# Patient Record
Sex: MALE | Race: WHITE | NOT HISPANIC OR LATINO | Employment: OTHER
[De-identification: names, ages, dates, MRNs, and addresses within clinical notes are randomized per-mention and may not be internally consistent; named-entity substitution may affect disease eponyms.]

---

## 2017-06-02 ENCOUNTER — RECORDS - HEALTHEAST (OUTPATIENT)
Dept: ADMINISTRATIVE | Facility: OTHER | Age: 72
End: 2017-06-02

## 2018-06-08 ENCOUNTER — RECORDS - HEALTHEAST (OUTPATIENT)
Dept: ADMINISTRATIVE | Facility: OTHER | Age: 73
End: 2018-06-08

## 2019-04-15 ENCOUNTER — AMBULATORY - HEALTHEAST (OUTPATIENT)
Dept: MULTI SPECIALTY CLINIC | Facility: CLINIC | Age: 74
End: 2019-04-15

## 2019-08-19 ENCOUNTER — RECORDS - HEALTHEAST (OUTPATIENT)
Dept: ADMINISTRATIVE | Facility: OTHER | Age: 74
End: 2019-08-19

## 2020-03-11 ENCOUNTER — COMMUNICATION - HEALTHEAST (OUTPATIENT)
Dept: SCHEDULING | Facility: CLINIC | Age: 75
End: 2020-03-11

## 2020-03-12 ENCOUNTER — OFFICE VISIT - HEALTHEAST (OUTPATIENT)
Dept: FAMILY MEDICINE | Facility: CLINIC | Age: 75
End: 2020-03-12

## 2020-03-12 ENCOUNTER — COMMUNICATION - HEALTHEAST (OUTPATIENT)
Dept: TELEHEALTH | Facility: CLINIC | Age: 75
End: 2020-03-12

## 2020-03-12 DIAGNOSIS — S02.109S: ICD-10-CM

## 2020-03-12 DIAGNOSIS — G44.309 POST-CONCUSSION HEADACHE: ICD-10-CM

## 2020-03-12 DIAGNOSIS — H91.93 BILATERAL HEARING LOSS, UNSPECIFIED HEARING LOSS TYPE: ICD-10-CM

## 2020-03-12 ASSESSMENT — MIFFLIN-ST. JEOR: SCORE: 1496.49

## 2020-03-16 ENCOUNTER — COMMUNICATION - HEALTHEAST (OUTPATIENT)
Dept: FAMILY MEDICINE | Facility: CLINIC | Age: 75
End: 2020-03-16

## 2020-03-17 ENCOUNTER — TRANSCRIBE ORDERS (OUTPATIENT)
Dept: OTHER | Age: 75
End: 2020-03-17

## 2020-03-17 DIAGNOSIS — H91.90 HEARING LOSS: Primary | ICD-10-CM

## 2020-03-17 DIAGNOSIS — S02.109A: ICD-10-CM

## 2020-03-18 ENCOUNTER — COMMUNICATION - HEALTHEAST (OUTPATIENT)
Dept: FAMILY MEDICINE | Facility: CLINIC | Age: 75
End: 2020-03-18

## 2020-03-19 NOTE — TELEPHONE ENCOUNTER
FUTURE VISIT INFORMATION      FUTURE VISIT INFORMATION:    Date: 4/21/20    Time: 9:30 AM; Audio 8:30 AM    Location: [facility] [place]  REFERRAL INFORMATION:    Referring provider: Dr. Diana Bess    Referring providers clinic:  [facility]    Reason for visit/diagnosis: Bilateral Hearing Loss and closed fracture of base of skull    RECORDS REQUESTED FROM:       Clinic name Comments Records Status Imaging Status   [facility] 3/12/20 - PCC OV with Dr. Bess Care Everywhere    [facility] 3/3/20 to 3/5/20 - ED Stay with Dr. Santos Care Everywhere     - [facility] - Imaging 3/4/20 - CT Head WO  3/3/20 - CT Ear 2D Reconstruction  3/3/20 - CT Head WO Care Everywhere 3/19 Req - In PACs                 * 3/19/20 2:40 PM Faxed req to [facility] for images to be pushed - Becka

## 2020-04-15 ENCOUNTER — TELEPHONE (OUTPATIENT)
Dept: OTOLARYNGOLOGY | Facility: CLINIC | Age: 75
End: 2020-04-15

## 2020-04-15 NOTE — TELEPHONE ENCOUNTER
"LVM to reschedule to video visit,      If pt would like to do telephone visit for 4/21 appt please confirm best number for contact. If pt would like video visit please confirm e-mail and let pt know a clinic staff member will reach out 2 days prior to help set up appointment.\"    If patient wants to reschedule to in person visit, schedule 8-12 weeks out.  "

## 2020-04-21 ENCOUNTER — PRE VISIT (OUTPATIENT)
Dept: OTOLARYNGOLOGY | Facility: CLINIC | Age: 75
End: 2020-04-21

## 2020-07-09 ENCOUNTER — TELEPHONE (OUTPATIENT)
Dept: OTOLARYNGOLOGY | Facility: CLINIC | Age: 75
End: 2020-07-09

## 2020-07-09 NOTE — TELEPHONE ENCOUNTER
LVM to reschedule to video visit,- Keep audio      If pt would like to do telephone visit for 7/ appt please confirm best number for contact. If pt would like video visit please confirm e-mail and sign them up for mychart as we will sent set up instructions though that.

## 2020-07-13 NOTE — TELEPHONE ENCOUNTER
LVMt hat appt has been converted to a telephone visit with a WIN prior.    If patient wishes to reschedule, reschedule to end of August when Dr. Nissen is back in clinic. Gave call center number

## 2020-09-14 ENCOUNTER — OFFICE VISIT - HEALTHEAST (OUTPATIENT)
Dept: FAMILY MEDICINE | Facility: CLINIC | Age: 75
End: 2020-09-14

## 2020-09-14 DIAGNOSIS — Z00.00 ROUTINE GENERAL MEDICAL EXAMINATION AT A HEALTH CARE FACILITY: ICD-10-CM

## 2020-09-14 DIAGNOSIS — I10 ESSENTIAL HYPERTENSION: ICD-10-CM

## 2020-09-14 DIAGNOSIS — M54.42 CHRONIC BILATERAL LOW BACK PAIN WITH LEFT-SIDED SCIATICA: ICD-10-CM

## 2020-09-14 DIAGNOSIS — N52.9 ERECTILE DYSFUNCTION, UNSPECIFIED ERECTILE DYSFUNCTION TYPE: ICD-10-CM

## 2020-09-14 DIAGNOSIS — G89.29 CHRONIC BILATERAL LOW BACK PAIN WITH LEFT-SIDED SCIATICA: ICD-10-CM

## 2020-09-14 LAB
ALBUMIN SERPL-MCNC: 4.3 G/DL (ref 3.5–5)
ALP SERPL-CCNC: 49 U/L (ref 45–120)
ALT SERPL W P-5'-P-CCNC: 25 U/L (ref 0–45)
ANION GAP SERPL CALCULATED.3IONS-SCNC: 10 MMOL/L (ref 5–18)
AST SERPL W P-5'-P-CCNC: 26 U/L (ref 0–40)
BILIRUB SERPL-MCNC: 0.3 MG/DL (ref 0–1)
BUN SERPL-MCNC: 36 MG/DL (ref 8–28)
CALCIUM SERPL-MCNC: 9.4 MG/DL (ref 8.5–10.5)
CHLORIDE BLD-SCNC: 108 MMOL/L (ref 98–107)
CHOLEST SERPL-MCNC: 178 MG/DL
CO2 SERPL-SCNC: 21 MMOL/L (ref 22–31)
CREAT SERPL-MCNC: 1.82 MG/DL (ref 0.7–1.3)
FASTING STATUS PATIENT QL REPORTED: YES
GFR SERPL CREATININE-BSD FRML MDRD: 37 ML/MIN/1.73M2
GLUCOSE BLD-MCNC: 88 MG/DL (ref 70–125)
HDLC SERPL-MCNC: 66 MG/DL
LDLC SERPL CALC-MCNC: 100 MG/DL
POTASSIUM BLD-SCNC: 4.8 MMOL/L (ref 3.5–5)
PROT SERPL-MCNC: 7.1 G/DL (ref 6–8)
SODIUM SERPL-SCNC: 139 MMOL/L (ref 136–145)
TRIGL SERPL-MCNC: 59 MG/DL

## 2020-09-14 ASSESSMENT — MIFFLIN-ST. JEOR: SCORE: 1493.09

## 2020-10-21 ENCOUNTER — OFFICE VISIT - HEALTHEAST (OUTPATIENT)
Dept: FAMILY MEDICINE | Facility: CLINIC | Age: 75
End: 2020-10-21

## 2020-10-21 DIAGNOSIS — N28.9 RENAL INSUFFICIENCY: ICD-10-CM

## 2020-10-21 DIAGNOSIS — I10 ESSENTIAL HYPERTENSION: ICD-10-CM

## 2020-10-21 LAB
ANION GAP SERPL CALCULATED.3IONS-SCNC: 10 MMOL/L (ref 5–18)
BUN SERPL-MCNC: 33 MG/DL (ref 8–28)
CALCIUM SERPL-MCNC: 9.3 MG/DL (ref 8.5–10.5)
CHLORIDE BLD-SCNC: 110 MMOL/L (ref 98–107)
CO2 SERPL-SCNC: 21 MMOL/L (ref 22–31)
CREAT SERPL-MCNC: 1.7 MG/DL (ref 0.7–1.3)
GFR SERPL CREATININE-BSD FRML MDRD: 40 ML/MIN/1.73M2
GLUCOSE BLD-MCNC: 99 MG/DL (ref 70–125)
POTASSIUM BLD-SCNC: 4.4 MMOL/L (ref 3.5–5)
SODIUM SERPL-SCNC: 141 MMOL/L (ref 136–145)

## 2020-11-25 ENCOUNTER — HOSPITAL ENCOUNTER (OUTPATIENT)
Dept: ULTRASOUND IMAGING | Facility: CLINIC | Age: 75
Discharge: HOME | End: 2020-11-25
Attending: FAMILY MEDICINE

## 2020-11-25 DIAGNOSIS — N28.9 RENAL INSUFFICIENCY: ICD-10-CM

## 2020-12-23 ENCOUNTER — COMMUNICATION - HEALTHEAST (OUTPATIENT)
Dept: FAMILY MEDICINE | Facility: CLINIC | Age: 75
End: 2020-12-23

## 2020-12-23 ENCOUNTER — AMBULATORY - HEALTHEAST (OUTPATIENT)
Dept: FAMILY MEDICINE | Facility: CLINIC | Age: 75
End: 2020-12-23

## 2020-12-23 DIAGNOSIS — D73.89 SPLENIC LESION: ICD-10-CM

## 2021-01-06 ENCOUNTER — HOSPITAL ENCOUNTER (OUTPATIENT)
Dept: MRI IMAGING | Facility: CLINIC | Age: 76
Discharge: HOME | End: 2021-01-06
Attending: FAMILY MEDICINE

## 2021-01-06 DIAGNOSIS — D73.89 SPLENIC LESION: ICD-10-CM

## 2021-01-06 LAB
CREAT BLD-MCNC: 1.7 MG/DL (ref 0.7–1.3)
GFR SERPL CREATININE-BSD FRML MDRD: 39 ML/MIN/1.73M2

## 2021-01-07 ENCOUNTER — COMMUNICATION - HEALTHEAST (OUTPATIENT)
Dept: FAMILY MEDICINE | Facility: CLINIC | Age: 76
End: 2021-01-07

## 2021-02-02 ENCOUNTER — RECORDS - HEALTHEAST (OUTPATIENT)
Dept: ADMINISTRATIVE | Facility: OTHER | Age: 76
End: 2021-02-02

## 2021-02-04 ENCOUNTER — AMBULATORY - HEALTHEAST (OUTPATIENT)
Dept: NURSING | Facility: CLINIC | Age: 76
End: 2021-02-04

## 2021-02-04 ENCOUNTER — COMMUNICATION - HEALTHEAST (OUTPATIENT)
Dept: FAMILY MEDICINE | Facility: CLINIC | Age: 76
End: 2021-02-04

## 2021-02-04 ENCOUNTER — AMBULATORY - HEALTHEAST (OUTPATIENT)
Dept: LAB | Facility: CLINIC | Age: 76
End: 2021-02-04

## 2021-02-04 DIAGNOSIS — I10 ESSENTIAL HYPERTENSION: ICD-10-CM

## 2021-02-04 DIAGNOSIS — N18.32 CHRONIC KIDNEY DISEASE (CKD) STAGE G3B/A1, MODERATELY DECREASED GLOMERULAR FILTRATION RATE (GFR) BETWEEN 30-44 ML/MIN/1.73 SQUARE METER AND ALBUMINURIA CREATININE RATIO LESS THAN 30 MG/G (H): ICD-10-CM

## 2021-02-04 LAB
ALBUMIN SERPL-MCNC: 4.5 G/DL (ref 3.5–5)
ALBUMIN UR-MCNC: NEGATIVE MG/DL
ANION GAP SERPL CALCULATED.3IONS-SCNC: 10 MMOL/L (ref 5–18)
APPEARANCE UR: CLEAR
BACTERIA #/AREA URNS HPF: NORMAL HPF
BILIRUB UR QL STRIP: NEGATIVE
BUN SERPL-MCNC: 38 MG/DL (ref 8–28)
CALCIUM SERPL-MCNC: 9.6 MG/DL (ref 8.5–10.5)
CHLORIDE BLD-SCNC: 108 MMOL/L (ref 98–107)
CO2 SERPL-SCNC: 23 MMOL/L (ref 22–31)
COLOR UR AUTO: YELLOW
CREAT SERPL-MCNC: 1.68 MG/DL (ref 0.7–1.3)
CREAT UR-MCNC: 124.4 MG/DL
ERYTHROCYTE [DISTWIDTH] IN BLOOD BY AUTOMATED COUNT: 10.2 % (ref 11–14.5)
GFR SERPL CREATININE-BSD FRML MDRD: 40 ML/MIN/1.73M2
GLUCOSE BLD-MCNC: 81 MG/DL (ref 70–125)
GLUCOSE UR STRIP-MCNC: NEGATIVE MG/DL
HCT VFR BLD AUTO: 38.8 % (ref 40–54)
HGB BLD-MCNC: 13.2 G/DL (ref 14–18)
HGB UR QL STRIP: NEGATIVE
HYALINE CASTS #/AREA URNS LPF: NORMAL LPF
KETONES UR STRIP-MCNC: NEGATIVE MG/DL
LEUKOCYTE ESTERASE UR QL STRIP: NEGATIVE
MCH RBC QN AUTO: 36.1 PG (ref 27–34)
MCHC RBC AUTO-ENTMCNC: 34 G/DL (ref 32–36)
MCV RBC AUTO: 106 FL (ref 80–100)
NITRATE UR QL: NEGATIVE
PH UR STRIP: 5.5 [PH] (ref 5–8)
PHOSPHATE SERPL-MCNC: 3.4 MG/DL (ref 2.5–4.5)
PLATELET # BLD AUTO: 360 THOU/UL (ref 140–440)
PMV BLD AUTO: 7.8 FL (ref 7–10)
POTASSIUM BLD-SCNC: 5.5 MMOL/L (ref 3.5–5)
PROTEIN, RANDOM URINE - HISTORICAL: 13 MG/DL
PROTEIN/CREAT RATIO, RANDOM UR: 0.1
PTH-INTACT SERPL-MCNC: 73 PG/ML (ref 10–86)
RBC # BLD AUTO: 3.65 MILL/UL (ref 4.4–6.2)
RBC #/AREA URNS AUTO: NORMAL HPF
SODIUM SERPL-SCNC: 141 MMOL/L (ref 136–145)
SP GR UR STRIP: 1.02 (ref 1–1.03)
SQUAMOUS #/AREA URNS AUTO: NORMAL LPF
UROBILINOGEN UR STRIP-ACNC: NORMAL
WBC #/AREA URNS AUTO: NORMAL HPF
WBC: 5.7 THOU/UL (ref 4–11)

## 2021-02-05 LAB
25(OH)D3 SERPL-MCNC: 23.8 NG/ML (ref 30–80)
ALBUMIN PERCENT: 69 % (ref 51–67)
ALBUMIN SERPL ELPH-MCNC: 4.9 G/DL (ref 3.2–4.7)
ALPHA 1 PERCENT: 2.3 % (ref 2–4)
ALPHA 2 PERCENT: 9.3 % (ref 5–13)
ALPHA1 GLOB SERPL ELPH-MCNC: 0.2 G/DL (ref 0.1–0.3)
ALPHA2 GLOB SERPL ELPH-MCNC: 0.7 G/DL (ref 0.4–0.9)
B-GLOBULIN SERPL ELPH-MCNC: 0.6 G/DL (ref 0.7–1.2)
BETA PERCENT: 8.5 % (ref 10–17)
GAMMA GLOB SERPL ELPH-MCNC: 0.8 G/DL (ref 0.6–1.4)
GAMMA GLOBULIN PERCENT: 10.9 % (ref 9–20)
M PROTEIN SERPL ELPH-MCNC: 0.2 G/DL
PATH ICD:: ABNORMAL
PROT PATTERN SERPL ELPH-IMP: ABNORMAL
PROT SERPL-MCNC: 7.1 G/DL (ref 6–8)
REVIEWING PATHOLOGIST: ABNORMAL

## 2021-02-07 LAB
KAPPA LC FREE SER-MCNC: 1.66 MG/DL (ref 0.33–1.94)
KAPPA LC FREE/LAMBDA FREE SER NEPH: 1.11 {RATIO} (ref 0.26–1.65)
LAMBDA LC FREE SERPL-MCNC: 1.49 MG/DL (ref 0.57–2.63)

## 2021-02-11 ENCOUNTER — AMBULATORY - HEALTHEAST (OUTPATIENT)
Dept: LAB | Facility: CLINIC | Age: 76
End: 2021-02-11

## 2021-02-11 DIAGNOSIS — N18.32 CHRONIC KIDNEY DISEASE (CKD) STAGE G3B/A1, MODERATELY DECREASED GLOMERULAR FILTRATION RATE (GFR) BETWEEN 30-44 ML/MIN/1.73 SQUARE METER AND ALBUMINURIA CREATININE RATIO LESS THAN 30 MG/G (H): ICD-10-CM

## 2021-03-04 ENCOUNTER — AMBULATORY - HEALTHEAST (OUTPATIENT)
Dept: LAB | Facility: CLINIC | Age: 76
End: 2021-03-04

## 2021-03-04 DIAGNOSIS — N18.32 CHRONIC KIDNEY DISEASE (CKD) STAGE G3B/A1, MODERATELY DECREASED GLOMERULAR FILTRATION RATE (GFR) BETWEEN 30-44 ML/MIN/1.73 SQUARE METER AND ALBUMINURIA CREATININE RATIO LESS THAN 30 MG/G (H): ICD-10-CM

## 2021-03-04 LAB
ANION GAP SERPL CALCULATED.3IONS-SCNC: 13 MMOL/L (ref 5–18)
BUN SERPL-MCNC: 66 MG/DL (ref 8–28)
CALCIUM SERPL-MCNC: 9.9 MG/DL (ref 8.5–10.5)
CHLORIDE BLD-SCNC: 107 MMOL/L (ref 98–107)
CO2 SERPL-SCNC: 18 MMOL/L (ref 22–31)
CREAT SERPL-MCNC: 2.09 MG/DL (ref 0.7–1.3)
GFR SERPL CREATININE-BSD FRML MDRD: 31 ML/MIN/1.73M2
GLUCOSE BLD-MCNC: 82 MG/DL (ref 70–125)
POTASSIUM BLD-SCNC: 6.2 MMOL/L (ref 3.5–5)
SODIUM SERPL-SCNC: 138 MMOL/L (ref 136–145)

## 2021-03-19 ENCOUNTER — RECORDS - HEALTHEAST (OUTPATIENT)
Dept: ADMINISTRATIVE | Facility: OTHER | Age: 76
End: 2021-03-19

## 2021-04-09 ENCOUNTER — AMBULATORY - HEALTHEAST (OUTPATIENT)
Dept: LAB | Facility: CLINIC | Age: 76
End: 2021-04-09

## 2021-04-09 DIAGNOSIS — N28.9 RENAL INSUFFICIENCY: ICD-10-CM

## 2021-04-09 DIAGNOSIS — I10 ESSENTIAL HYPERTENSION: ICD-10-CM

## 2021-04-09 LAB
ALBUMIN UR-MCNC: NEGATIVE G/DL
APPEARANCE UR: CLEAR
BACTERIA #/AREA URNS HPF: NORMAL /[HPF]
BILIRUB UR QL STRIP: NEGATIVE
COLOR UR AUTO: YELLOW
GLUCOSE UR STRIP-MCNC: NEGATIVE MG/DL
HGB UR QL STRIP: NEGATIVE
HYALINE CASTS #/AREA URNS LPF: NORMAL LPF
KETONES UR STRIP-MCNC: NEGATIVE MG/DL
LEUKOCYTE ESTERASE UR QL STRIP: NEGATIVE
NITRATE UR QL: NEGATIVE
PH UR STRIP: 6 [PH] (ref 5–8)
RBC #/AREA URNS AUTO: NORMAL HPF
SP GR UR STRIP: 1.02 (ref 1–1.03)
SQUAMOUS #/AREA URNS AUTO: NORMAL LPF
TSH SERPL DL<=0.005 MIU/L-ACNC: 0.92 UIU/ML (ref 0.3–5)
UROBILINOGEN UR STRIP-ACNC: NORMAL
WBC #/AREA URNS AUTO: NORMAL HPF

## 2021-04-14 ENCOUNTER — COMMUNICATION - HEALTHEAST (OUTPATIENT)
Dept: ADMINISTRATIVE | Facility: CLINIC | Age: 76
End: 2021-04-14

## 2021-04-15 ENCOUNTER — AMBULATORY - HEALTHEAST (OUTPATIENT)
Dept: FAMILY MEDICINE | Facility: CLINIC | Age: 76
End: 2021-04-15

## 2021-04-15 DIAGNOSIS — N28.9 RENAL INSUFFICIENCY: ICD-10-CM

## 2021-04-15 DIAGNOSIS — I10 ESSENTIAL HYPERTENSION: ICD-10-CM

## 2021-04-23 ENCOUNTER — COMMUNICATION - HEALTHEAST (OUTPATIENT)
Dept: LAB | Facility: CLINIC | Age: 76
End: 2021-04-23

## 2021-04-23 ENCOUNTER — AMBULATORY - HEALTHEAST (OUTPATIENT)
Dept: LAB | Facility: CLINIC | Age: 76
End: 2021-04-23

## 2021-04-23 DIAGNOSIS — N18.32 CHRONIC KIDNEY DISEASE (CKD) STAGE G3B/A1, MODERATELY DECREASED GLOMERULAR FILTRATION RATE (GFR) BETWEEN 30-44 ML/MIN/1.73 SQUARE METER AND ALBUMINURIA CREATININE RATIO LESS THAN 30 MG/G (H): ICD-10-CM

## 2021-04-23 LAB
ANION GAP SERPL CALCULATED.3IONS-SCNC: 14 MMOL/L (ref 5–18)
BUN SERPL-MCNC: 31 MG/DL (ref 8–28)
CALCIUM SERPL-MCNC: 9.6 MG/DL (ref 8.5–10.5)
CHLORIDE BLD-SCNC: 106 MMOL/L (ref 98–107)
CO2 SERPL-SCNC: 21 MMOL/L (ref 22–31)
CREAT SERPL-MCNC: 1.43 MG/DL (ref 0.7–1.3)
GFR SERPL CREATININE-BSD FRML MDRD: 48 ML/MIN/1.73M2
GLUCOSE BLD-MCNC: 79 MG/DL (ref 70–125)
POTASSIUM BLD-SCNC: 5 MMOL/L (ref 3.5–5)
SODIUM SERPL-SCNC: 141 MMOL/L (ref 136–145)

## 2021-04-26 ENCOUNTER — RECORDS - HEALTHEAST (OUTPATIENT)
Dept: ADMINISTRATIVE | Facility: OTHER | Age: 76
End: 2021-04-26

## 2021-05-05 ENCOUNTER — OFFICE VISIT - HEALTHEAST (OUTPATIENT)
Dept: FAMILY MEDICINE | Facility: CLINIC | Age: 76
End: 2021-05-05

## 2021-05-05 DIAGNOSIS — N18.32 CHRONIC KIDNEY DISEASE (CKD) STAGE G3B/A1, MODERATELY DECREASED GLOMERULAR FILTRATION RATE (GFR) BETWEEN 30-44 ML/MIN/1.73 SQUARE METER AND ALBUMINURIA CREATININE RATIO LESS THAN 30 MG/G (H): ICD-10-CM

## 2021-05-05 DIAGNOSIS — N28.9 RENAL INSUFFICIENCY: ICD-10-CM

## 2021-05-05 DIAGNOSIS — I10 ESSENTIAL HYPERTENSION: ICD-10-CM

## 2021-05-05 DIAGNOSIS — G89.29 CHRONIC BILATERAL LOW BACK PAIN WITH LEFT-SIDED SCIATICA: ICD-10-CM

## 2021-05-05 DIAGNOSIS — M54.42 CHRONIC BILATERAL LOW BACK PAIN WITH LEFT-SIDED SCIATICA: ICD-10-CM

## 2021-05-17 ENCOUNTER — COMMUNICATION - HEALTHEAST (OUTPATIENT)
Dept: PHYSICAL MEDICINE AND REHAB | Facility: CLINIC | Age: 76
End: 2021-05-17

## 2021-05-24 ENCOUNTER — RECORDS - HEALTHEAST (OUTPATIENT)
Dept: ADMINISTRATIVE | Facility: OTHER | Age: 76
End: 2021-05-24

## 2021-05-26 VITALS — DIASTOLIC BLOOD PRESSURE: 68 MMHG | SYSTOLIC BLOOD PRESSURE: 122 MMHG | OXYGEN SATURATION: 98 % | HEART RATE: 61 BPM

## 2021-05-27 ENCOUNTER — RECORDS - HEALTHEAST (OUTPATIENT)
Dept: RADIOLOGY | Facility: CLINIC | Age: 76
End: 2021-05-27

## 2021-05-27 ENCOUNTER — HOSPITAL ENCOUNTER (OUTPATIENT)
Dept: PHYSICAL MEDICINE AND REHAB | Facility: CLINIC | Age: 76
Discharge: HOME | End: 2021-05-27
Attending: FAMILY MEDICINE
Payer: COMMERCIAL

## 2021-05-27 VITALS
WEIGHT: 179 LBS | DIASTOLIC BLOOD PRESSURE: 64 MMHG | OXYGEN SATURATION: 98 % | BODY MASS INDEX: 25.68 KG/M2 | HEART RATE: 62 BPM | SYSTOLIC BLOOD PRESSURE: 132 MMHG

## 2021-05-27 VITALS — DIASTOLIC BLOOD PRESSURE: 64 MMHG | SYSTOLIC BLOOD PRESSURE: 122 MMHG

## 2021-05-27 DIAGNOSIS — M51.369 DEGENERATION OF LUMBAR INTERVERTEBRAL DISC: ICD-10-CM

## 2021-05-27 DIAGNOSIS — G56.03 BILATERAL CARPAL TUNNEL SYNDROME: ICD-10-CM

## 2021-05-27 DIAGNOSIS — M41.9 SCOLIOSIS OF LUMBAR SPINE, UNSPECIFIED SCOLIOSIS TYPE: ICD-10-CM

## 2021-05-27 DIAGNOSIS — M54.50 LUMBAR SPINE PAIN: ICD-10-CM

## 2021-05-27 ASSESSMENT — MIFFLIN-ST. JEOR: SCORE: 1529.38

## 2021-06-04 VITALS
DIASTOLIC BLOOD PRESSURE: 58 MMHG | WEIGHT: 171 LBS | BODY MASS INDEX: 25.33 KG/M2 | HEIGHT: 69 IN | OXYGEN SATURATION: 97 % | HEART RATE: 60 BPM | SYSTOLIC BLOOD PRESSURE: 98 MMHG

## 2021-06-04 VITALS
HEIGHT: 69 IN | BODY MASS INDEX: 25.18 KG/M2 | HEART RATE: 72 BPM | DIASTOLIC BLOOD PRESSURE: 68 MMHG | WEIGHT: 170 LBS | SYSTOLIC BLOOD PRESSURE: 100 MMHG

## 2021-06-06 NOTE — TELEPHONE ENCOUNTER
JULISSA for pt.. Per ENT the pt should be going to the [facility]. They should call him today to schedule

## 2021-06-06 NOTE — TELEPHONE ENCOUNTER
Patient will be calling back         From [place] calling, because patient walked into clinic today   After having a head injury.in the past few days.      He has verified that he returned from a cruise to [place] on 02/28/2020   Patient must be screened for Corona Virus.      Patient was directed to call us back from his own phone, so that we can triage him over the phone for Corona Virus.     We will then follow thru with protocol for patient .    Rea Armas RN  [facility] Triage/refill nurse

## 2021-06-11 NOTE — PROGRESS NOTES
Assessment and Plan:       Problem List Items Addressed This Visit        Edg Concept Cardiac Problems    Essential hypertension     Blood pressure too low; reduced Lisinopril dose to 10/12.5 mg daily         Relevant Medications    atenoloL (TENORMIN) 25 MG tablet    lisinopriL-hydrochlorothiazide (PRINZIDE,ZESTORETIC) 10-12.5 mg per tablet    Other Relevant Orders    Comprehensive Metabolic Panel (Completed)    Lipid Profile (Completed)       Other    Bilateral low back pain with left-sided sciatica     Responds very well to Gabapentin; prescription given today for the 300mg dose to take up to TID         Relevant Medications    gabapentin (NEURONTIN) 300 MG capsule      Other Visit Diagnoses     Routine general medical examination at a health care facility    -  Primary    Erectile dysfunction, unspecified erectile dysfunction type        Relevant Medications    sildenafiL (VIAGRA) 100 MG tablet    sildenafil (REVATIO) 20 mg tablet        We discussed his chronic sciatica problems and positive response to gabapentin with good tolerability.  I faxed in a prescription for him to be able to take 300 mg up to 3 times daily.  We discussed erectile dysfunction and treatment options.  Ultimately, a prescription for Viagra was given that also discussed that insurance coverage and cost may be a barrier.  In that case, generic sildenafil in the form of 20 mg tablets would be an option and a prescription for that was faxed into his pharmacy.  We reviewed his preventive care checklist as it relates to immunizations and cancer screening today.  I decreased his blood pressure medication and asked for a follow-up in about 1 month regarding his home blood pressure readings.    The patient's current medical problems were reviewed.    I have had an Advance Directives discussion with the patient.  The following health maintenance schedule was reviewed with the patient and provided in printed form in the after visit summary:    Health Maintenance   Topic Date Due     HEPATITIS C SCREENING  1945     ADVANCE CARE PLANNING  12/21/1963     ZOSTER VACCINES (1 of 2) 12/21/1995     MEDICARE ANNUAL WELLNESS VISIT  09/14/2021     FALL RISK ASSESSMENT  09/14/2021     TD 18+ HE  12/26/2024     LIPID  09/14/2025     COLORECTAL CANCER SCREENING  04/15/2029     PNEUMOCOCCAL IMMUNIZATION 65+ LOW/MEDIUM RISK  Completed     INFLUENZA VACCINE RULE BASED  Completed     HEPATITIS B VACCINES  Aged Out        Subjective:   Chief Complaint: Crispin Farr is an 74 y.o. male here for an Annual Wellness visit.   HPI: Crispin is a 74-year-old gentleman presenting today for an annual wellness visit and to establish care.  The patient overall is doing well.  He would like to discuss gabapentin as he got off that medication but did not realize how effective it helpful it was for more chronic sciatica type pain.  Wonders if he can increase the gabapentin a bit.  He tolerates medication well without any side effects.  He denies any associated weakness.  He also would like to discuss erectile dysfunction and treatment options for that.    Review of Systems: Please see above.  The rest of the review of systems are negative for all systems.    Patient Care Team:  Diana Bess MD as PCP - General (Family Medicine)  Diana Bess MD as Assigned PCP     Patient Active Problem List   Diagnosis     Essential hypertension     DDD (degenerative disc disease), lumbar     Shoulder arthritis     Family history of colon cancer     Closed fracture of base of skull, unspecified laterality, sequela (H)     Bilateral low back pain with left-sided sciatica     No past medical history on file.   No past surgical history on file.   No family history on file.   Social History     Socioeconomic History     Marital status:      Spouse name: Not on file     Number of children: Not on file     Years of education: Not on file     Highest education level: Not on file    Occupational History     Not on file   Social Needs     Financial resource strain: Not on file     Food insecurity     Worry: Not on file     Inability: Not on file     Transportation needs     Medical: Not on file     Non-medical: Not on file   Tobacco Use     Smoking status: Never Smoker     Smokeless tobacco: Never Used   Substance and Sexual Activity     Alcohol use: Not on file     Drug use: Not on file     Sexual activity: Not on file   Lifestyle     Physical activity     Days per week: Not on file     Minutes per session: Not on file     Stress: Not on file   Relationships     Social connections     Talks on phone: Not on file     Gets together: Not on file     Attends Orthodoxy service: Not on file     Active member of club or organization: Not on file     Attends meetings of clubs or organizations: Not on file     Relationship status: Not on file     Intimate partner violence     Fear of current or ex partner: Not on file     Emotionally abused: Not on file     Physically abused: Not on file     Forced sexual activity: Not on file   Other Topics Concern     Not on file   Social History Narrative     Not on file      Current Outpatient Medications   Medication Sig Dispense Refill     atenoloL (TENORMIN) 25 MG tablet Take 1 tablet (25 mg total) by mouth daily. 90 tablet 3     cholecalciferol, vitamin D3, 50 mcg (2,000 unit) Tab Take 2,000 Units by mouth.       fish oil-omega-3 fatty acids 300-1,000 mg capsule Take 2 g by mouth daily.       folic acid (FOLVITE) 1 MG tablet Take 1 mg by mouth daily.       vit B comp-C-FA-iron-vit E 500 mg-400 mcg- 18 mg iron Tab Take 2 tablets by mouth.       gabapentin (NEURONTIN) 300 MG capsule Take 1 capsule (300 mg total) by mouth 3 (three) times a day. 270 capsule 1     lisinopriL-hydrochlorothiazide (PRINZIDE,ZESTORETIC) 10-12.5 mg per tablet Take 1 tablet by mouth daily. 90 tablet 3     sildenafil (REVATIO) 20 mg tablet Take 3-5 tablets PO 1 hour prior to intercourse  "45 tablet 3     sildenafiL (VIAGRA) 100 MG tablet Take 1/2-1 tablet PO 1 hour prior to intercourse 30 tablet 2     No current facility-administered medications for this visit.       Objective:   Vital Signs:   Visit Vitals  BP 98/58 (Patient Site: Left Arm, Patient Position: Sitting, Cuff Size: Adult Regular)   Pulse 60   Ht 5' 8.5\" (1.74 m)   Wt 171 lb (77.6 kg)   SpO2 97%   BMI 25.62 kg/m           VisionScreening:  No exam data present     PHYSICAL EXAM  Physical Exam  Vitals signs and nursing note reviewed.   Constitutional:       Appearance: He is well-developed.   HENT:      Head: Atraumatic.      Right Ear: External ear normal.      Left Ear: External ear normal.   Eyes:      General: No scleral icterus.  Neck:      Musculoskeletal: Normal range of motion.      Thyroid: No thyromegaly.   Cardiovascular:      Rate and Rhythm: Normal rate and regular rhythm.      Heart sounds: Normal heart sounds. No murmur.   Pulmonary:      Effort: Pulmonary effort is normal.      Breath sounds: Normal breath sounds. No wheezing.   Abdominal:      Palpations: Abdomen is soft. There is no mass.   Lymphadenopathy:      Cervical: No cervical adenopathy.   Skin:     Findings: No rash.   Neurological:      Mental Status: He is alert and oriented to person, place, and time.           Assessment Results 9/14/2020   Activities of Daily Living No help needed   Instrumental Activities of Daily Living No help needed   Some recent data might be hidden     A Mini-Cog score of 0-2 suggests the possibility of dementia, score of 3-5 suggests no dementia      Identified Health Risks:     He is at risk for falling and has been provided with information to reduce the risk of falling at home.  Information regarding advance directives (living grewal), including where he can download the appropriate form, was provided to the patient via the AVS.       "

## 2021-06-12 NOTE — PROGRESS NOTES
Assessment/Plan:     Problem List Items Addressed This Visit        Edg Concept Cardiac Problems    Essential hypertension - Primary    Relevant Medications    lisinopriL-hydrochlorothiazide (PRINZIDE,ZESTORETIC) 20-25 mg per tablet      Other Visit Diagnoses     Renal insufficiency        Relevant Orders    Basic Metabolic Panel (Completed)        He will continue on his blood pressure medication regimen and dosing as he had prior to my last visit and continue to monitor his blood pressure.  I did recheck a basic metabolic panel today as he has new onset renal insufficiency recently.    Subjective:       74 y.o. male presents for a blood pressure follow-up visit.  The patient was seen about a month ago for an annual wellness visit at which time it was noted that his blood pressure was low and had been the last couple of visits.  I recommended an adjustment in his blood pressure medication and the patient proceeded to reduce his medication as recommended.  However, he monitored his blood pressure and found that it elevated significantly up into the 160s.  He ultimately resumed his previous dosing of his lisinopril hydrochlorothiazide and feels that his blood pressure is back under good control.      Reviewed: The following portions of the patient's history were reviewed and updated as appropriate: allergies, current medications, past family history, past medical history, past social history, past surgical history and problem list.    Review of Systems  Pertinent items are noted in HPI.        Objective:     /68 (Patient Site: Left Arm, Patient Position: Sitting, Cuff Size: Adult Large)   Pulse 61   SpO2 98%   General appearance: alert, appears stated age and cooperative  Lungs: clear to auscultation bilaterally  Heart: regular rate and rhythm, S1, S2 normal, no murmur, click, rub or gallop      This note has been dictated using voice recognition software. Any grammatical or context distortions are  unintentional and inherent to the software

## 2021-06-14 NOTE — TELEPHONE ENCOUNTER
Left message to call back for: results  Information to relay to patient: see below message from Dr. Bess and relay

## 2021-06-14 NOTE — TELEPHONE ENCOUNTER
Reason for Call:  Request for results:    Name of test or procedure: Ultrasound Kidneys    Date of test of procedure: 11/25/20    Location of the test or procedure: [facility]    OK to leave the result message on voice mail or with a family member? Yes    Phone number Patient can be reached at: Cell: [phone]    Additional comments: Pt missed the call from the clinic yesterday and thinks it is about the [place] results.    Call taken on 12/23/2020 at 9:36 AM by Dorothy Kruger

## 2021-06-14 NOTE — TELEPHONE ENCOUNTER
Spoke with Crispin relayed test results and note written below. He understands that there is no follow up needed right now.

## 2021-06-14 NOTE — TELEPHONE ENCOUNTER
----- Message from Diana Bess MD sent at 1/6/2021  4:53 PM CST -----  Let patient know that the splenic lesion does appear to be consistent with a benign hemangioma.  There is some fatty liver disease seen but no other concerning abnormalities.  Nothing further needs to be done.

## 2021-06-15 NOTE — TELEPHONE ENCOUNTER
L/M informing pt of results and recomendations   Patient came in for a [facility] BP Check today. Mentioned he needs his lisinopril-hydrochlorothiazide sent into [facility] pharmacy for refills.  Is not using [facility] anymore.       Wolfgang'd up.       Diana Phillips CMA 2/4/2021 10:42 AM   [facility]

## 2021-06-15 NOTE — PROGRESS NOTES
I met with Crispin Farr at the request of Diana Bess MD   to recheck his blood pressure.  Blood pressure medications on the MAR were reviewed with patient.    Patient has taken all medications as per usual regimen: Yes  Patient reports tolerating them without any issues or concerns: Yes    Vitals:    02/04/21 1033   BP: 122/64       Blood pressure was taken, previous encounter was reviewed, recorded blood pressure below 140/90.  Patient was discharged and the note will be sent to the provider for final review.        Diana Phillips CMA 2/4/2021 10:37 AM   Linda MARTINS

## 2021-06-16 NOTE — TELEPHONE ENCOUNTER
Reason for Call:  Request for results:    Name of test or procedure: Lab results    Date of test of procedure: 4/9/21    Location of the test or procedure: [facility]    OK to leave the result message on voice mail or with a family member? Yes    Phone number Patient can be reached at:   Cell number on file:    Telephone Information:   Mobile [phone]       Additional comments: Lab results did not post to [employer] so pt wanted to know what the results are.     Call taken on 4/14/2021 at 1:08 PM by Dorothy Kruger

## 2021-06-16 NOTE — TELEPHONE ENCOUNTER
Please advise as covering, I do not see any documentation from Dr. Bess or Dr. Lozano that labs are needed.

## 2021-06-16 NOTE — TELEPHONE ENCOUNTER
Patient is calling again to get lab results, may have missed a call yesterday. No message was left. He wants the nephrologist to get the results as well.  [phone]

## 2021-06-16 NOTE — TELEPHONE ENCOUNTER
Pt is on lab schedule this am and no orders in chart.  Called and talked to pt. He said Dr. Bess had wanted him to follow up on his kidney function labs. Please place orders.

## 2021-06-17 NOTE — PROGRESS NOTES
Assessment/Plan:     Problem List Items Addressed This Visit        Edg Concept Cardiac Problems    Essential hypertension - Primary     The patient is now on a different blood pressure regimen with a combination of amlodipine 5 mg and atenolol 25 mg daily.  His blood pressure is under good control here in clinic and he has been monitoring at home and it is the same.  Continue            Other    Bilateral low back pain with left-sided sciatica     Recommended consultation with the spine care center.  This is been evaluated by orthopedist in the past including ruling out hip pathology.  He would like to know what other treatment options he has         Relevant Orders    Ambulatory referral to Spine Care    Renal insufficiency     Significant improvement in GFR with discontinuation of lisinopril, hydrochlorothiazide and improved hydration         Relevant Orders    Basic Metabolic Panel            Subjective:       75 y.o. male presents for a routine medication check visit.  The patient was referred for consultation with a nephrologist and his blood pressure regimen has been changed.  He now is completely off lisinopril and hydrochlorothiazide and is on a combination of amlodipine 5 mg and atenolol 25 mg daily.  He is monitoring his blood pressures at home and they are consistently under good control.  He is feeling well on this regimen.  The patient also would like to discuss back pain today he has a longstanding history of chronic low back pain with radiculopathy.  He was seen by an orthopedist in the past and they discussed options including cortisone injection or surgery.  He states that that was about 5 years ago and it has progressively gotten worse.  He continues to take Neurontin and has questions about how much Tylenol he can take during the day.  He would also like to know what other options and wonders why recommend he see regarding this.  He states that he did have imaging of his hip and that they  excluded that that was the cause of his pain in the past.      Reviewed: The following portions of the patient's history were reviewed and updated as appropriate: allergies, current medications, past family history, past medical history, past social history, past surgical history and problem list.    Review of Systems  Pertinent items are noted in HPI.        Objective:     /64 (Patient Site: Left Arm, Patient Position: Sitting, Cuff Size: Adult Large)   Pulse 62   Wt 179 lb (81.2 kg)   SpO2 98%   BMI 26.82 kg/m    General appearance: alert, appears stated age and cooperative  Lungs: clear to auscultation bilaterally  Heart: regular rate and rhythm, S1, S2 normal, no murmur, click, rub or gallop      This note has been dictated using voice recognition software. Any grammatical or context distortions are unintentional and inherent to the software

## 2021-06-18 NOTE — PATIENT INSTRUCTIONS - HE
Patient Instructions by Diana Bess MD at 9/14/2020 10:20 AM     Author: Diana Bess MD Service: -- Author Type: Physician    Filed: 9/14/2020 11:02 AM Encounter Date: 9/14/2020 Status: Signed    : Diana Bess MD (Physician)         Patient Education   Preventing Falls in the Home  As you get older, falls are more likely. Thats because your reaction time slows. Your muscles and joints may also get stiffer, making them less flexible. Illness, medications, and vision changes can also affect your balance. A fall could leave you unable to live on your own. To make your home safer, follow these tips:    Floors    Put nonskid pads under area rugs.    Remove throw rugs.    Replace worn floor coverings.    Tack carpets firmly to each step on carpeted stairs. Put nonskid strips on the edges of uncarpeted stairs.    Keep floors and stairs free of clutter and cords.    Arrange furniture so there are clear pathways.    Clean up any spills right away.    Bathrooms    Install grab bars in the tub or shower.    Apply nonskid strips or put a nonskid rubber mat in the tub or shower.    Sit on a bath chair to bathe.    Use bathmats with nonskid backing.    Lighting    Keep a flashlight in each room.    Put a nightlight along the pathway between the bedroom and the bathroom.    5419-6809 [employer]. [address] [place] [address]. All rights reserved. This information is not intended as a substitute for professional medical care. Always follow your healthcare professional's instructions.         Patient Education   Understanding Advance Care Planning  Advance care planning is the process of deciding ones own future medical care. It helps ensure that if you cant speak for yourself, your wishes can still be carried out. The plan is a series of legal documents that note a persons wishes. The documents vary by state. Advance care planning may be done when a person has a serious  illness that is expected to get worse. It may be done before major surgery. And it can help you and your family be prepared in case of a major illness or injury. Advance care planning helps with making decisions at these times.       A health care proxy is a person who acts as the voice of a patient when the patient cant speak for himself or herself. The name of this role varies by state. It may be called a Durable Medical Power of  or Durable Power of  for Healthcare. It may be called an agent, surrogate, or advocate. Or it may be called a representative or decision maker. It is an official duty that is identified by a legal document. The document also varies by state.    Why Is Advance Care Planning Important?  If a person communicates their healthcare wishes:    They will be given medical care that matches their values and goals.    Their family members will not be forced to make decisions in a crisis with no guidance.  Creating a Plan  Making an advance care plan is often done in 3 steps:    Thinking about ones wishes. To create an advance care plan, you should think about what kind of medical treatment you would want if you lose the ability to communicate. Are there any situations in which you would refuse or stop treatment? Are there therapies you would want or not want? And whom do you want to make decisions for you? There are many places to learn more about how to plan for your care. Ask your doctor or  for resources.    Picking a health care proxy. This means choosing a trusted person to speak for you only when you cant speak for yourself. When you cannot make medical decisions, your proxy makes sure the instructions in your advance care plan are followed. A proxy does not make decisions based on his or her own opinions. They must put aside those opinions and values if needed, and carry out your wishes.    Filling out the legal documents. There are several kinds of legal  documents for advance care planning. Each one tells health care providers your wishes. The documents may vary by state. They must be signed and may need to be witnessed or notarized. You can cancel or change them whenever you wish. Depending on your state, the documents may include a Healthcare Proxy form, Living Will, Durable Medical Power of , Advance Directive, or others.  The Familys Role  The best help a family can give is to support their loved ones wishes. Open and honest communication is vital. Family should express any concerns they have about the patients choices while the patient can still make decisions.    9225-1131 [employer]. [address] [place] [address]. All rights reserved. This information is not intended as a substitute for professional medical care. Always follow your healthcare professional's instructions.         Also, [facility] offers a free, downloadable health care directive that allows you to share your treatment choices and personal preferences if you cannot communicate your wishes. It also allows you to appoint another person (called a health care agent) to make health care decisions if you are unable to do so. You can download an advance directive by going here: [email or web address]     Patient Education   Personalized Prevention Plan  You are due for the preventive services outlined below.  Your care team is available to assist you in scheduling these services.  If you have already completed any of these items, please share that information with your care team to update in your medical record.  Health Maintenance   Topic Date Due   ? HEPATITIS C SCREENING  1945   ? ADVANCE CARE PLANNING  12/21/1963   ? LIPID  12/21/1980   ? ZOSTER VACCINES (1 of 2) 12/21/1995   ? MEDICARE ANNUAL WELLNESS VISIT  12/21/2010   ? INFLUENZA VACCINE RULE BASED (1) 08/01/2020   ? FALL RISK ASSESSMENT  03/12/2021   ? TD 18+ HE   12/26/2024   ? COLORECTAL CANCER SCREENING  04/15/2029   ? PNEUMOCOCCAL IMMUNIZATION 65+ LOW/MEDIUM RISK  Completed   ? HEPATITIS B VACCINES  Aged Out

## 2021-06-21 NOTE — LETTER
Letter by Guillermina Piña at      Author: Guillermina Piña Service: -- Author Type: --    Filed:  Encounter Date: 5/17/2021 Status: (Other)         Crispin BARCLAY Yordan  [address] [place] [address]      May 17, 2021      Dear Mr. Farr,      At [facility], we care about your health and well-being. Recently, we received a  referral to get you scheduled at the [facility]. We have attempted to  assist you in scheduling this appointment and have been unsuccessful in reaching you.    Please call our Intake line at your earliest convenience for assistance in scheduling an  appointment. Thank you for choosing [facility].      Sincerely,        [facility] Intake team  [phone]

## 2021-06-25 NOTE — PROGRESS NOTES
ASSESSMENT: Crispin Farr is a 75 y.o. male with past medical history significant for hypertension, renal insufficiency who presents today for new patient evaluation of chronic low back pain and bilateral upper buttock pain.  Patient has not had any dedicated advance imaging of the lumbar spine.  I did review an MRI abdomen which showed a levoconvex lumbar curvature apex L3.  There is significant disc degeneration at L2-3, L3-4, L4-5.  On the limited study there was suggestion of some lower lumbar spinal stenosis and I suspect there is also foraminal stenosis related to the scoliotic curvature.  Patient does describe some limited walking and standing tolerance which could be related to lumbar spinal stenosis.  He also describes pain in the sacroiliac joint region but SI joint provocative testing was negative other than a positive Brady's test on the right and he did not have significant tenderness to palpation of the sacroiliac joints on exam today.  Patient was neurologically intact.  -Patient also complains of intermittent numbness and tingling in both hands.  Suspect carpal tunnel syndrome.    LOLIS: 34  Who 5: 24    PLAN:  A shared decision making model was used.  The patient's values and choices were respected.  The following represents what was discussed and decided upon by the physician assistant and the patient.      1.  DIAGNOSTIC TESTS: I reviewed the MRI abdomen.  I ordered an MRI lumbar spine for further evaluation.    2.  PHYSICAL THERAPY:  Discussed the importance of core strengthening, ROM, stretching exercises with the patient and how each of these entities is important in decreasing pain.  Explained to the patient that the purpose of physical therapy is to teach the patient a home exercise program.  These exercises need to be performed every day in order to decrease pain and prevent future occurrences of pain.  Entered a referral to physical therapy.  Patient would like to go to a facility near   [place] where he spends much of his time in the summer.  We will print a copy of the referral so he can hand carry it to the facility of his choice.    3.  MEDICATIONS:   -Patient takes gabapentin 300 milligrams 3 times daily.  Patient also this medication previously seem to be more effective than it is now.  We could consider titrating his dose higher if needed.  -Patient can continue Tylenol as needed.  -I recommended that the patient try over-the-counter pain relieving patches with lidocaine.  -Patient should avoid NSAIDs given renal insufficiency.    4.  INTERVENTIONS: No interventions were ordered.  Patient may benefit from interventional pain management if he fails to improve with conservative treatment, depending on the results of advanced imaging studies.    5.  PATIENT EDUCATION: Patient is in agreement the above plan.  All questions were answered.  -I recommended the patient wear over-the-counter wrist splints for carpal tunnel syndrome.  If carpal tunnel symptoms worsen, recommend EMG and possible injection/surgical consultation.    6.  FOLLOW-UP:   A nurse will call the patient with the results of his MRI lumbar spine.  At that time I will likely recommend that he trial physical therapy for 4 weeks and then follow-up with me versus have the patient return to the clinic to discuss options.  If he has questions or concerns in the meantime, he should not hesitate to call.      SUBJECTIVE:  Crispin Farr  Is a 75 y.o. male who presents today in consultation request of Dr. Bess for new patient evaluation of low back pain and bilateral buttock pain.  Patient reports that he has had back pain since the 1990s.  He states that when he first developed back pain it was severe.  He tried NSAIDs and they were not helpful.  After about 2 months he saw his primary care provider and was prescribed a Medrol Dosepak.  Patient reports that medication was very helpful.  His pain resolved completely.  He reports he  did well for 5 to 6 years.  After that he began to experience intermittent low back pain which she could manage on his own with NSAIDs.  2 to 3 years ago pain increased in the low back and also spread into the upper buttocks (patient describes this as hips).  He did x-rays on himself (patient is a retired ) which did not show any arthritis.  He was referred to a sports medicine specialist and had an MRI of his lumbar spine.  He was told that he had bulging disks.  Since then the pain has gradually worsened.  Patient attributes his back problems to working for 42 years as a .  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    Patient complains of 2 areas of pain.  The first pain is in the midline in the lower lumbar region.  He states that this pain comes and goes.  It feels deep.  Occurs when reaching, especially overhead.  He states this pain is a sharp pain.    He also complains of pain in the bilateral upper buttocks.  This is worse on the left than the right.  He denies any pain radiating further down the legs.  He states that the pain in this area is more common.  He feels stiffness in the morning.  It takes him time to warm up.  Sometimes sitting upright is very painful in the morning and he has to sit in the recliner.  Walking and standing can aggravate the pain and he sometimes feels limited in the length of time he can be standing and walking, but on most days he is able to take his dogs for a 3 mile walk without stopping.  Patient states that once in a while he feels an abnormal sensation in the bilateral anterior shins which he describes as ants crawling or bee sting.  This does not happen very frequently.  He denies any weakness in the legs..  Patient denies any loss of bowel or bladder control.  Denies any recent fevers, chills, or sweats.    Patient has not had any physical therapy for this.  He does not go to chiropractor.  He has never had any spine  surgeries or spine injections.  He takes gabapentin 300 g 3 times daily.  He states this medication does not seem to be as effective as it did previously.  He takes Tylenol as needed which helps.  He avoids NSAIDs due to mild kidney issues.    Current Outpatient Medications on File Prior to Encounter   Medication Sig Dispense Refill     amLODIPine (NORVASC) 5 MG tablet Take 1 tablet (5 mg total) by mouth daily. 90 tablet 3     atenoloL (TENORMIN) 25 MG tablet Take 1 tablet (25 mg total) by mouth daily. 90 tablet 3     cholecalciferol, vitamin D3, 50 mcg (2,000 unit) Tab Take 2,000 Units by mouth.       fish oil-omega-3 fatty acids 300-1,000 mg capsule Take 2 g by mouth daily.       folic acid (FOLVITE) 1 MG tablet Take 1 mg by mouth daily.       gabapentin (NEURONTIN) 300 MG capsule Take 1 capsule (300 mg total) by mouth 3 (three) times a day. 270 capsule 1     sildenafil (REVATIO) 20 mg tablet Take 3-5 tablets PO 1 hour prior to intercourse 45 tablet 3     vit B comp-C-FA-iron-vit E 500 mg-400 mcg- 18 mg iron Tab Take 1 tablet by mouth.          No Known Allergies    Patient Active Problem List   Diagnosis     Essential hypertension     DDD (degenerative disc disease), lumbar     Shoulder arthritis     Family history of colon cancer     Closed fracture of base of skull, unspecified laterality, sequela (H)     Bilateral low back pain with left-sided sciatica     Renal insufficiency     Surgical history: Right ankle fracture surgery 1973, left shoulder replacement    Family history: Mother had heart disease and a stroke    Social history: Patient is .  He is a retired .  He drinks 1 beer with dinner.  He denies tobacco use.  He denies illicit drug use.    ROS: Positive for sexual dysfunction, joint pain.  Specifically negative for bowel/bladder dysfunction, fevers,chills, appetite changes, unexplained weight loss.   Otherwise 13 systems reviewed are negative.  Please see the patient's intake  questionnaire from today for details.      OBJECTIVE:  PHYSICAL EXAMINATION:    CONSTITUTIONAL:  Vital signs as above.  No acute distress.  The patient is well nourished and well groomed.  PSYCHIATRIC:  The patient is awake, alert, oriented to person, place, time and answering questions appropriately with clear speech.    HEENT: Normocephalic, atraumatic.  Sclera clear.  Neck is supple.  SKIN:  Skin over the face, bilateral lower extremities, and posterior torso is clean, dry, intact without rashes.    GAIT:  Gait is non-antalgic.  Patient is unable to toe walk on the right due to right ankle pain.  Able to heel walk bilaterally.  STANDING EXAMINATION: No significant tenderness palpation midline lumbar spinous processes.  No tenderness palpation bilateral lower lumbar paraspinous muscles.  No tenderness palpation bilateral sacroiliac joints.  Lumbar flexion mildly restricted.  Lumbar extension mildly restricted.  Lumbar facet loading maneuvers did not reproduce pain.  MUSCLE STRENGTH:  The patient has 5/5 strength for the bilateral hip flexors, knee flexors/extensors, ankle dorsiflexors/plantar flexors, great toe extensors, ankle evertors/invertors.  NEUROLOGICAL: 1+ patellar, and achilles reflexes bilaterally.  Negative Babinski's bilaterally.  No ankle clonus bilaterally. Sensation to light touch is intact in the bilateral L4, L5, and S1 dermatomes.  VASCULAR:  2/4 dorsalis pedis pulses bilaterally.  Bilateral lower extremities are warm.  There is no pitting edema of the bilateral lower extremities.  ABDOMINAL:  Soft, non-distended, non-tender throughout all quadrants.  No pulsatile mass palpated in the left lower quadrant.  LYMPH NODES:  No palpable or tender inguinal lymph nodes.  MUSCULOSKELETAL: Straight leg raise negative bilaterally.  Negative pelvic distraction test.  Negative thigh thrust bilaterally.  Brady's test positive on the right reproducing pain localizing to the right SI joint.  Brady's test  negative left.  Negative Gaenslen's test bilaterally.  Negative Yeoman's test bilaterally.    RESULTS: I reviewed an MRI abdomen which shows levoconvex lumbar curvature apex L3.  There is significant disc degeneration at L2-3, L3-4 and L4-5.  There is some suggestion of spinal stenosis lower lumbar spine on this study.

## 2021-06-26 NOTE — PATIENT INSTRUCTIONS - HE
[facility] Scheduling    Please call [phone] to schedule your image(s) (select option #1). There are 2 different locations, see below. You can do walk-in visits for xray only images if you want.     [place] [phone]      [address] [address]      You can look for over the counter pain relieving patches with lidocaine such as icyhot and aspercreme

## 2021-06-29 ENCOUNTER — AMBULATORY - HEALTHEAST (OUTPATIENT)
Dept: LAB | Facility: CLINIC | Age: 76
End: 2021-06-29

## 2021-06-29 DIAGNOSIS — N18.32 CHRONIC KIDNEY DISEASE (CKD) STAGE G3B/A1, MODERATELY DECREASED GLOMERULAR FILTRATION RATE (GFR) BETWEEN 30-44 ML/MIN/1.73 SQUARE METER AND ALBUMINURIA CREATININE RATIO LESS THAN 30 MG/G (H): ICD-10-CM

## 2021-06-29 DIAGNOSIS — N28.9 RENAL INSUFFICIENCY: ICD-10-CM

## 2021-06-29 LAB
ANION GAP SERPL CALCULATED.3IONS-SCNC: 15 MMOL/L (ref 5–18)
BUN SERPL-MCNC: 29 MG/DL (ref 8–28)
CALCIUM SERPL-MCNC: 9.4 MG/DL (ref 8.5–10.5)
CHLORIDE BLD-SCNC: 106 MMOL/L (ref 98–107)
CO2 SERPL-SCNC: 19 MMOL/L (ref 22–31)
CREAT SERPL-MCNC: 1.22 MG/DL (ref 0.7–1.3)
CREAT UR-MCNC: 112.9 MG/DL
GFR SERPL CREATININE-BSD FRML MDRD: 58 ML/MIN/1.73M2
GLUCOSE BLD-MCNC: 84 MG/DL (ref 70–125)
POTASSIUM BLD-SCNC: 4.8 MMOL/L (ref 3.5–5)
PROTEIN, RANDOM URINE - HISTORICAL: 10 MG/DL
PROTEIN/CREAT RATIO, RANDOM UR: 0.09
PTH-INTACT SERPL-MCNC: 46 PG/ML (ref 10–86)
SODIUM SERPL-SCNC: 140 MMOL/L (ref 136–145)

## 2021-06-30 ENCOUNTER — RECORDS - HEALTHEAST (OUTPATIENT)
Dept: ADMINISTRATIVE | Facility: OTHER | Age: 76
End: 2021-06-30

## 2021-06-30 LAB — 25(OH)D3 SERPL-MCNC: 42.4 NG/ML (ref 30–80)

## 2021-07-01 ENCOUNTER — HOSPITAL ENCOUNTER (OUTPATIENT)
Dept: MRI IMAGING | Facility: CLINIC | Age: 76
Discharge: HOME | End: 2021-07-01
Attending: PHYSICIAN ASSISTANT
Payer: COMMERCIAL

## 2021-07-01 DIAGNOSIS — M41.9 SCOLIOSIS OF LUMBAR SPINE, UNSPECIFIED SCOLIOSIS TYPE: ICD-10-CM

## 2021-07-01 DIAGNOSIS — M51.369 DEGENERATION OF LUMBAR INTERVERTEBRAL DISC: ICD-10-CM

## 2021-07-01 DIAGNOSIS — M54.50 LUMBAR SPINE PAIN: ICD-10-CM

## 2021-07-02 ENCOUNTER — COMMUNICATION - HEALTHEAST (OUTPATIENT)
Dept: PHYSICAL MEDICINE AND REHAB | Facility: CLINIC | Age: 76
End: 2021-07-02

## 2021-07-02 DIAGNOSIS — M48.061 SPINAL STENOSIS OF LUMBAR REGION, UNSPECIFIED WHETHER NEUROGENIC CLAUDICATION PRESENT: ICD-10-CM

## 2021-07-03 NOTE — ADDENDUM NOTE
Addendum Note by Diana Bess MD at 10/21/2020  4:00 PM     Author: Diana Bess MD Service: -- Author Type: Physician    Filed: 11/6/2020  9:34 AM Encounter Date: 10/21/2020 Status: Signed    : Diana Bess MD (Physician)    Addended by: DIANA BESS on: 11/6/2020 09:34 AM        Modules accepted: Orders

## 2021-07-04 NOTE — TELEPHONE ENCOUNTER
Telephone Encounter by Alisha Santiago PA-C at 7/2/2021  2:54 PM     Author: Alisha Santiago PA-C Service: -- Author Type: Physician Assistant    Filed: 7/2/2021  2:54 PM Encounter Date: 7/2/2021 Status: Signed    : Alisha Santiago PA-C (Physician Assistant)       Neurosurgery referral entered.

## 2021-07-04 NOTE — TELEPHONE ENCOUNTER
Telephone Encounter by Windy Pack RN at 7/2/2021  9:11 AM     Author: Windy Pack RN Service: -- Author Type: Registered Nurse    Filed: 7/2/2021  9:11 AM Encounter Date: 7/2/2021 Status: Signed    : Windy Pack RN (Registered Nurse)       Call placed to patient with provider's results and recommendations.   Left message to return call.

## 2021-07-04 NOTE — TELEPHONE ENCOUNTER
Telephone Encounter by Libia Blackburn RN at 7/2/2021  2:22 PM     Author: Libia Blackburn RN Service: -- Author Type: Registered Nurse    Filed: 7/2/2021  2:25 PM Encounter Date: 7/2/2021 Status: Signed    : Libia Blackburn RN (Registered Nurse)       Patient returned call. Results given and explained. Discussed the options of injections for temporary relief as well as meeting with neurosurgeon to discuss more definite treatment options. Patient does not want to have injections at this time. He is open to seeing the neurosurgery. Contact information provided [facility]. Order placed in [facility] and pended.

## 2021-07-04 NOTE — ADDENDUM NOTE
Addendum Note by Becka Lewis CMA at 5/5/2021 10:00 AM     Author: Becka Lewis CMA Service: -- Author Type: Certified Medical Assistant    Filed: 5/24/2021 11:49 AM Encounter Date: 5/5/2021 Status: Signed    : Becka Lewis CMA (Certified Medical Assistant)    Addended by: BECKA LEWIS on: 5/24/2021 11:49 AM        Modules accepted: Orders

## 2021-07-04 NOTE — TELEPHONE ENCOUNTER
Telephone Encounter by Windy Pack RN at 7/2/2021  9:10 AM     Author: Windy Pack RN Service: -- Author Type: Registered Nurse    Filed: 7/2/2021  9:10 AM Encounter Date: 7/2/2021 Status: Signed    : Windy Pack RN (Registered Nurse)       ----- Message from Alisha Santiago PA-C sent at 7/2/2021  7:17 AM CDT -----  Please call this patient and let him know that I reviewed his MRI lumbar spine.  This does show severe spinal stenosis (narrowing around the nerves) at L3-4 and L4-5.  Please let the patient know that we can try epidural steroid injections which can provide temporary relief of his pain.  Due to the severity of the spinal stenosis, he may wish to speak with a surgeon for more definitive treatment.  If the patient would like a surgical referral, please enter and I will cosign.  If he is interested in trying injections, he should follow-up with me so that I can evaluate and determine which injection to try first.  He will need 40 minutes for a follow-up visit.  He can continue PT in the meantime.

## 2021-07-06 ENCOUNTER — AMBULATORY - HEALTHEAST (OUTPATIENT)
Dept: NEUROSURGERY | Facility: CLINIC | Age: 76
End: 2021-07-06

## 2021-07-06 VITALS — HEIGHT: 69 IN | BODY MASS INDEX: 26.51 KG/M2 | WEIGHT: 179 LBS

## 2021-07-06 DIAGNOSIS — M54.16 LUMBAR RADICULOPATHY: ICD-10-CM

## 2021-07-08 ENCOUNTER — TRANSFERRED RECORDS (OUTPATIENT)
Dept: HEALTH INFORMATION MANAGEMENT | Facility: CLINIC | Age: 76
End: 2021-07-08

## 2021-07-09 ENCOUNTER — HOSPITAL ENCOUNTER (OUTPATIENT)
Dept: RADIOLOGY | Facility: HOSPITAL | Age: 76
Discharge: HOME | End: 2021-07-09
Attending: NEUROLOGICAL SURGERY
Payer: COMMERCIAL

## 2021-07-09 DIAGNOSIS — M54.16 LUMBAR RADICULOPATHY: ICD-10-CM

## 2021-07-13 ENCOUNTER — TELEPHONE (OUTPATIENT)
Dept: NEUROSURGERY | Facility: CLINIC | Age: 76
End: 2021-07-13

## 2021-07-13 DIAGNOSIS — M41.9 SCOLIOSIS: Primary | ICD-10-CM

## 2021-07-14 ENCOUNTER — TELEPHONE (OUTPATIENT)
Dept: NEUROSURGERY | Facility: CLINIC | Age: 76
End: 2021-07-14

## 2021-08-03 ENCOUNTER — TELEPHONE (OUTPATIENT)
Dept: NEUROSURGERY | Facility: CLINIC | Age: 76
End: 2021-08-03

## 2021-10-09 ENCOUNTER — HEALTH MAINTENANCE LETTER (OUTPATIENT)
Age: 76
End: 2021-10-09

## 2021-10-16 ENCOUNTER — MEDICAL CORRESPONDENCE (OUTPATIENT)
Dept: HEALTH INFORMATION MANAGEMENT | Facility: CLINIC | Age: 76
End: 2021-10-16
Payer: COMMERCIAL

## 2021-10-20 ENCOUNTER — OFFICE VISIT (OUTPATIENT)
Dept: FAMILY MEDICINE | Facility: CLINIC | Age: 76
End: 2021-10-20
Payer: COMMERCIAL

## 2021-10-20 ENCOUNTER — ANCILLARY PROCEDURE (OUTPATIENT)
Dept: GENERAL RADIOLOGY | Facility: CLINIC | Age: 76
End: 2021-10-20
Attending: FAMILY MEDICINE
Payer: COMMERCIAL

## 2021-10-20 VITALS
HEIGHT: 68 IN | HEART RATE: 60 BPM | WEIGHT: 181.3 LBS | SYSTOLIC BLOOD PRESSURE: 138 MMHG | DIASTOLIC BLOOD PRESSURE: 76 MMHG | BODY MASS INDEX: 27.48 KG/M2 | OXYGEN SATURATION: 95 %

## 2021-10-20 DIAGNOSIS — Z00.00 MEDICARE ANNUAL WELLNESS VISIT, SUBSEQUENT: Primary | ICD-10-CM

## 2021-10-20 DIAGNOSIS — N18.32 STAGE 3B CHRONIC KIDNEY DISEASE (H): ICD-10-CM

## 2021-10-20 DIAGNOSIS — Z87.891 HISTORY OF CIGARETTE SMOKING: ICD-10-CM

## 2021-10-20 DIAGNOSIS — M51.369 DEGENERATION OF INTERVERTEBRAL DISC OF LUMBAR REGION: ICD-10-CM

## 2021-10-20 DIAGNOSIS — I10 ESSENTIAL HYPERTENSION: ICD-10-CM

## 2021-10-20 DIAGNOSIS — R05.3 CHRONIC COUGH: ICD-10-CM

## 2021-10-20 DIAGNOSIS — Z13.6 SCREENING FOR AAA (ABDOMINAL AORTIC ANEURYSM): ICD-10-CM

## 2021-10-20 PROBLEM — M54.42 BILATERAL LOW BACK PAIN WITH LEFT-SIDED SCIATICA: Status: ACTIVE | Noted: 2020-09-14

## 2021-10-20 PROBLEM — Z80.0 FAMILY HISTORY OF COLON CANCER: Status: ACTIVE | Noted: 2019-08-19

## 2021-10-20 LAB
ANION GAP SERPL CALCULATED.3IONS-SCNC: 10 MMOL/L (ref 5–18)
BUN SERPL-MCNC: 24 MG/DL (ref 8–28)
CALCIUM SERPL-MCNC: 9.8 MG/DL (ref 8.5–10.5)
CHLORIDE BLD-SCNC: 104 MMOL/L (ref 98–107)
CO2 SERPL-SCNC: 25 MMOL/L (ref 22–31)
CREAT SERPL-MCNC: 1.4 MG/DL (ref 0.7–1.3)
GFR SERPL CREATININE-BSD FRML MDRD: 49 ML/MIN/1.73M2
GLUCOSE BLD-MCNC: 94 MG/DL (ref 70–125)
POTASSIUM BLD-SCNC: 4.9 MMOL/L (ref 3.5–5)
SODIUM SERPL-SCNC: 139 MMOL/L (ref 136–145)

## 2021-10-20 PROCEDURE — 99397 PER PM REEVAL EST PAT 65+ YR: CPT | Mod: 25 | Performed by: FAMILY MEDICINE

## 2021-10-20 PROCEDURE — 99213 OFFICE O/P EST LOW 20 MIN: CPT | Mod: 25 | Performed by: FAMILY MEDICINE

## 2021-10-20 PROCEDURE — 71046 X-RAY EXAM CHEST 2 VIEWS: CPT | Mod: TC | Performed by: RADIOLOGY

## 2021-10-20 PROCEDURE — 36415 COLL VENOUS BLD VENIPUNCTURE: CPT | Performed by: FAMILY MEDICINE

## 2021-10-20 PROCEDURE — 90471 IMMUNIZATION ADMIN: CPT | Performed by: FAMILY MEDICINE

## 2021-10-20 PROCEDURE — 90662 IIV NO PRSV INCREASED AG IM: CPT | Performed by: FAMILY MEDICINE

## 2021-10-20 PROCEDURE — 80048 BASIC METABOLIC PNL TOTAL CA: CPT | Performed by: FAMILY MEDICINE

## 2021-10-20 RX ORDER — AMLODIPINE BESYLATE 5 MG/1
5 TABLET ORAL
COMMUNITY
Start: 2021-03-19 | End: 2021-10-20

## 2021-10-20 RX ORDER — GABAPENTIN 300 MG/1
2 CAPSULE ORAL 3 TIMES DAILY
COMMUNITY
Start: 2020-09-14 | End: 2023-01-09

## 2021-10-20 RX ORDER — AMLODIPINE BESYLATE 10 MG/1
10 TABLET ORAL DAILY
Qty: 90 TABLET | Refills: 1 | Status: SHIPPED | OUTPATIENT
Start: 2021-10-20 | End: 2023-01-09

## 2021-10-20 RX ORDER — VITAMIN B COMPLEX
1 CAPSULE ORAL
COMMUNITY
End: 2021-10-20

## 2021-10-20 RX ORDER — AMLODIPINE BESYLATE 10 MG/1
10 TABLET ORAL DAILY
Qty: 90 TABLET | Refills: 1 | Status: SHIPPED | OUTPATIENT
Start: 2021-10-20 | End: 2021-10-20

## 2021-10-20 RX ORDER — ACETAMINOPHEN 500 MG
2 TABLET ORAL
COMMUNITY
Start: 2020-03-04 | End: 2023-01-09

## 2021-10-20 RX ORDER — ATENOLOL 25 MG/1
25 TABLET ORAL DAILY
Qty: 90 TABLET | Refills: 1 | Status: SHIPPED | OUTPATIENT
Start: 2021-10-20 | End: 2023-01-09

## 2021-10-20 RX ORDER — SODIUM BICARBONATE 325 MG/1
325 TABLET ORAL
COMMUNITY
Start: 2021-06-30 | End: 2023-01-09

## 2021-10-20 RX ORDER — SILDENAFIL CITRATE 20 MG/1
TABLET ORAL
COMMUNITY
Start: 2020-09-14 | End: 2023-01-09

## 2021-10-20 RX ORDER — FOLIC ACID 1 MG/1
1 TABLET ORAL
COMMUNITY

## 2021-10-20 ASSESSMENT — ACTIVITIES OF DAILY LIVING (ADL): CURRENT_FUNCTION: NO ASSISTANCE NEEDED

## 2021-10-20 ASSESSMENT — MIFFLIN-ST. JEOR: SCORE: 1531.87

## 2021-10-20 NOTE — PATIENT INSTRUCTIONS
Patient Education   Personalized Prevention Plan  You are due for the preventive services outlined below.  Your care team is available to assist you in scheduling these services.  If you have already completed any of these items, please share that information with your care team to update in your medical record.  Health Maintenance Due   Topic Date Due     ANNUAL REVIEW OF HM ORDERS  Never done     Hepatitis C Screening  Never done     Zoster (Shingles) Vaccine (1 of 2) Never done     AORTIC ANEURYSM SCREENING (SYSTEM ASSIGNED)  Never done

## 2021-10-20 NOTE — PROGRESS NOTES
"SUBJECTIVE:   Crispin Farr is a 75 year old male who presents for Preventive Visit.    Patient has been advised of split billing requirements and indicates understanding: Yes   Are you in the first 12 months of your Medicare coverage?  No    Healthy Habits:     In general, how would you rate your overall health?  Good    Frequency of exercise:  4-5 days/week    Duration of exercise:  Greater than 60 minutes    Do you usually eat at least 4 servings of fruit and vegetables a day, include whole grains    & fiber and avoid regularly eating high fat or \"junk\" foods?  Yes    Taking medications regularly:  Yes    Medication side effects:  None    Ability to successfully perform activities of daily living:  No assistance needed    Home Safety:  No safety concerns identified    Hearing Impairment:  No hearing concerns    In the past 6 months, have you been bothered by leaking of urine?  No    In general, how would you rate your overall mental or emotional health?  Excellent      PHQ-2 Total Score: 0    Additional concerns today:  Yes    Do you feel safe in your environment? Yes    Have you ever done Advance Care Planning? (For example, a Health Directive, POLST, or a discussion with a medical provider or your loved ones about your wishes): No, advance care planning information given to patient to review.  Advanced care planning was discussed at today's visit.       Fall risk  Fallen 2 or more times in the past year?: No  Any fall with injury in the past year?: No  Cognitive Screening   1) Repeat 3 items (Leader, Season, Table)    2) Clock draw: NORMAL  3) 3 item recall: Recalls 3 objects  Results: 3 items recalled: COGNITIVE IMPAIRMENT LESS LIKELY    Mini-CogTM Copyright DOUGIE Robles. Licensed by the author for use in [facility]; reprinted with permission (penny@.[facility]). All rights reserved.      Do you have sleep apnea, excessive snoring or daytime drowsiness?: no    Reviewed and updated as needed this visit by " clinical staff  Tobacco  Allergies  Meds              Reviewed and updated as needed this visit by Provider                Social History     Tobacco Use     Smoking status: Former Smoker     Smokeless tobacco: Never Used   Substance Use Topics     Alcohol use: Yes     Alcohol/week: 3.0 standard drinks     If you drink alcohol do you typically have >3 drinks per day or >7 drinks per week? Yes      Alcohol Use 10/20/2021   Prescreen: >3 drinks/day or >7 drinks/week? Yes   Prescreen: >3 drinks/day or >7 drinks/week? -   AUDIT SCORE  4           Hypertension Follow-up      Do you check your blood pressure regularly outside of the clinic? Yes     Are you following a low salt diet? Yes    Are your blood pressures ever more than 140 on the top number (systolic) OR more   than 90 on the bottom number (diastolic), for example 140/90? Yes    Patient is concerned regarding his blood pressure. He has noticed that he is having elevated readings to 140's/80's. He has been taking it in the morning, 2 hours after he takes his medications, and describes it as a resting blood pressure. He has increased his amlodipine to 7.5mg.    Chronic Kidney Disease Follow-up      Do you take any over the counter pain medicine?: No    Patient would like repeat labs done today.    Cough-  Patient describes a 6 month history of intermittent cough. He says he will cough once or twice a day and is able to produce thick phlegm. He denies any shortness of breath or other respiratory symptoms and has not tried anything at home.    Current providers sharing in care for this patient include:   Patient Care Team:  Arjun Lozano DO as PCP - General (Internal Medicine)  Diana Bess as Referring Physician (Family Practice)  Nissen, Rick L, MD as MD (Otolaryngology)  Katarzyna Mcdonough AuD as Audiologist (Audiology)  Diana Bess as Assigned PCP    The following health maintenance items are reviewed in Epic and correct as of today:  Health  Maintenance Due   Topic Date Due     ANNUAL REVIEW OF  ORDERS  Never done     HEPATITIS C SCREENING  Never done     ZOSTER IMMUNIZATION (1 of 2) Never done     AORTIC ANEURYSM SCREENING (SYSTEM ASSIGNED)  Never done     Lab work is in process  BP Readings from Last 3 Encounters:   10/20/21 138/76   07/09/21 (!) 153/80   05/05/21 132/64    Wt Readings from Last 3 Encounters:   10/20/21 82.2 kg (181 lb 4.8 oz)   05/05/21 81.2 kg (179 lb)   09/14/20 77.6 kg (171 lb)                  Patient Active Problem List   Diagnosis     Shoulder arthritis     Bilateral low back pain with left-sided sciatica     Degeneration of intervertebral disc of lumbar region     Essential hypertension     Stage 3b chronic kidney disease (H)     Family history of colon cancer     Past Surgical History:   Procedure Laterality Date     ARTHROPLASTY SHOULDER Left 8/23/2016    Procedure: ARTHROPLASTY SHOULDER;  Surgeon: Varinder Perez MD;  Location: [facility] OR     OPEN REDUCTION INTERNAL FIXATION ANKLE Right 1973       Social History     Tobacco Use     Smoking status: Former Smoker     Smokeless tobacco: Never Used   Substance Use Topics     Alcohol use: Yes     Alcohol/week: 3.0 standard drinks     Family History   Problem Relation Age of Onset     Coronary Artery Disease Father          Current Outpatient Medications   Medication Sig Dispense Refill     acetaminophen (TYLENOL) 500 MG tablet Take 2 tablets by mouth       amLODIPine (NORVASC) 10 MG tablet Take 1 tablet (10 mg) by mouth daily 90 tablet 1     atenolol (TENORMIN) 25 MG tablet Take 1 tablet (25 mg) by mouth daily 90 tablet 1     ATENOLOL PO Take 25 mg by mouth daily        folic acid (FOLVITE) 1 MG tablet Take 1 tablet by mouth       gabapentin (NEURONTIN) 300 MG capsule Take 1 capsule by mouth       hydrOXYzine (ATARAX) 10 MG tablet Take 1 tablet (10 mg) by mouth every 6 hours as needed for itching 60 tablet 0     Omega-3 Fatty Acids (OMEGA-3 FISH OIL PO) Take 1,200 mg by mouth  "daily        sildenafil (REVATIO) 20 MG tablet Take 3-5 tablets PO 1 hour prior to intercourse       sodium bicarbonate 325 MG tablet Take 325 mg by mouth       vitamin  B complex with vitamin C (VITAMIN  B COMPLEX) TABS Take 2 tablets by mouth daily       VITAMIN D, CHOLECALCIFEROL, PO Take 2,000 Units by mouth daily       No Known Allergies    Review of Systems  Constitutional, HEENT, cardiovascular, pulmonary, gi and gu systems are negative, except as otherwise noted.    OBJECTIVE:   /76 (BP Location: Left arm, Patient Position: Left side, Cuff Size: Adult Large)   Pulse 60   Ht 1.727 m (5' 8\")   Wt 82.2 kg (181 lb 4.8 oz)   SpO2 95%   BMI 27.57 kg/m   Estimated body mass index is 27.57 kg/m  as calculated from the following:    Height as of this encounter: 1.727 m (5' 8\").    Weight as of this encounter: 82.2 kg (181 lb 4.8 oz).     Physical Exam  GENERAL: healthy, alert and no distress  EYES: Eyes grossly normal to inspection, PERRL and conjunctivae and sclerae normal  HENT: ear canals and TM's normal, nose and mouth without ulcers or lesions  NECK: no adenopathy, no asymmetry, masses, or scars and thyroid normal to palpation  RESP: lungs clear to auscultation - no rales, rhonchi or wheezes  CV: regular rate and rhythm, normal S1 S2, no S3 or S4, no murmur, click or rub, no peripheral edema and peripheral pulses strong  ABDOMEN: soft, nontender, no hepatosplenomegaly, no masses and bowel sounds normal  MS: no gross musculoskeletal defects noted, very mild ankle edema  SKIN: no suspicious lesions or rashes  NEURO: Normal strength and tone, mentation intact and speech normal  PSYCH: mentation appears normal, affect normal/bright    Diagnostic Test Results:  Labs reviewed in Epic  No results found for this or any previous visit (from the past 24 hour(s)).  CXR - Personally reviewed. Lungs appear clear.     ASSESSMENT / PLAN:     Problem List Items Addressed This Visit        Circulatory    Essential " hypertension     BP is elevated in clinic and patient describes consistent elevations of resting BP at home (140s/80s). He is reliable in terms of his technique, so likely these are true readings. Will plan on increasing his amlodipine to 10mg daily. Discussed potential for lower extremity edema. He will continue to monitor at home; if blood pressures remain elevated, can increase his atenolol to 50mg daily or 25mg BID to achieve optimal readings. Will follow-up in the coming weeks regarding this.          Relevant Medications    atenolol (TENORMIN) 25 MG tablet    amLODIPine (NORVASC) 10 MG tablet    Other Relevant Orders    US Abdominal Aorta Imaging       Musculoskeletal and Integumentary    Degeneration of intervertebral disc of lumbar region     Pain is still present. Patient is being followed by spine. They placed a referral for physical therapy at their last visit, but patient did not schedule due to frequent travel he was doing. He is planning on calling the office to inquire if they still have an active referral and he will reach out if he needs another placed.         Relevant Medications    acetaminophen (TYLENOL) 500 MG tablet       Urinary    Stage 3b chronic kidney disease (H)     Improving, based on last laboratory values. Will recheck BMP today and follow up on results.          Relevant Orders    Basic metabolic panel  (Ca, Cl, CO2, Creat, Gluc, K, Na, BUN)      Other Visit Diagnoses     Medicare annual wellness visit, subsequent    -  Primary    Chronic cough        Relevant Orders    XR Chest 2 Views    Screening for AAA (abdominal aortic aneurysm)        Relevant Orders    US Abdominal Aorta Imaging    History of cigarette smoking        Relevant Orders    US Abdominal Aorta Imaging        Patient has been advised of split billing requirements and indicates understanding: Yes  COUNSELING:  Reviewed preventive health counseling, as reflected in patient instructions       Consider AAA screening for  "ages 65-75 and smoking history       Regular exercise       Healthy diet/nutrition       Dental care       Fall risk prevention       Immunizations    Vaccinated for: Influenza             Colon cancer screening    Estimated body mass index is 27.57 kg/m  as calculated from the following:    Height as of this encounter: 1.727 m (5' 8\").    Weight as of this encounter: 82.2 kg (181 lb 4.8 oz).        He reports that he has quit smoking. He has never used smokeless tobacco.      Appropriate preventive services were discussed with this patient, including applicable screening as appropriate for cardiovascular disease, diabetes, osteopenia/osteoporosis, and glaucoma.  As appropriate for age/gender, discussed screening for colorectal cancer, prostate cancer, breast cancer, and cervical cancer. Checklist reviewing preventive services available has been given to the patient.    Reviewed patients plan of care and provided an AVS. The Basic Care Plan (routine screening as documented in Health Maintenance) for Crispin meets the Care Plan requirement. This Care Plan has been established and reviewed with the Patient.    Counseling Resources:  ATP IV Guidelines  Pooled Cohorts Equation Calculator  Breast Cancer Risk Calculator  Breast Cancer: Medication to Reduce Risk  FRAX Risk Assessment  ICSI Preventive Guidelines  Dietary Guidelines for Americans, 2010  USDA's MyPlate  ASA Prophylaxis  Lung CA Screening    DIEGO CHOWDHURY  [facility]    Identified Health Risks:  "

## 2021-10-20 NOTE — ASSESSMENT & PLAN NOTE
Pain is still present. Patient is being followed by spine. They placed a referral for physical therapy at their last visit, but patient did not schedule due to frequent travel he was doing. He is planning on calling the office to inquire if they still have an active referral and he will reach out if he needs another placed.

## 2021-10-20 NOTE — ASSESSMENT & PLAN NOTE
BP is elevated in clinic and patient describes consistent elevations of resting BP at home (140s/80s). He is reliable in terms of his technique, so likely these are true readings. Will plan on increasing his amlodipine to 10mg daily. Discussed potential for lower extremity edema. He will continue to monitor at home; if blood pressures remain elevated, can increase his atenolol to 50mg daily or 25mg BID to achieve optimal readings. Will follow-up in the coming weeks regarding this.

## 2021-10-26 ENCOUNTER — HOSPITAL ENCOUNTER (OUTPATIENT)
Dept: ULTRASOUND IMAGING | Facility: HOSPITAL | Age: 76
Discharge: HOME | End: 2021-10-26
Attending: FAMILY MEDICINE | Admitting: FAMILY MEDICINE
Payer: COMMERCIAL

## 2021-10-26 DIAGNOSIS — Z87.891 HISTORY OF CIGARETTE SMOKING: ICD-10-CM

## 2021-10-26 DIAGNOSIS — I10 ESSENTIAL HYPERTENSION: ICD-10-CM

## 2021-10-26 DIAGNOSIS — Z13.6 SCREENING FOR AAA (ABDOMINAL AORTIC ANEURYSM): ICD-10-CM

## 2021-10-26 PROCEDURE — 76775 US EXAM ABDO BACK WALL LIM: CPT

## 2021-12-15 DIAGNOSIS — N18.31 CHRONIC KIDNEY DISEASE, STAGE 3A (H): Primary | ICD-10-CM

## 2022-01-06 ENCOUNTER — TRANSFERRED RECORDS (OUTPATIENT)
Dept: HEALTH INFORMATION MANAGEMENT | Facility: CLINIC | Age: 77
End: 2022-01-06
Payer: COMMERCIAL

## 2022-01-19 ENCOUNTER — LAB (OUTPATIENT)
Dept: LAB | Facility: CLINIC | Age: 77
End: 2022-01-19
Payer: COMMERCIAL

## 2022-01-19 DIAGNOSIS — N18.31 CHRONIC KIDNEY DISEASE, STAGE 3A (H): ICD-10-CM

## 2022-01-19 LAB
ALBUMIN MFR UR ELPH: 29.1 MG/DL
ALBUMIN SERPL-MCNC: 4.6 G/DL (ref 3.5–5)
ANION GAP SERPL CALCULATED.3IONS-SCNC: 14 MMOL/L (ref 5–18)
BUN SERPL-MCNC: 26 MG/DL (ref 8–28)
CALCIUM SERPL-MCNC: 9.8 MG/DL (ref 8.5–10.5)
CHLORIDE BLD-SCNC: 105 MMOL/L (ref 98–107)
CO2 SERPL-SCNC: 22 MMOL/L (ref 22–31)
CREAT SERPL-MCNC: 1.42 MG/DL (ref 0.7–1.3)
CREAT UR-MCNC: 408 MG/DL
GFR SERPL CREATININE-BSD FRML MDRD: 51 ML/MIN/1.73M2
GLUCOSE BLD-MCNC: 90 MG/DL (ref 70–125)
PHOSPHATE SERPL-MCNC: 3.1 MG/DL (ref 2.5–4.5)
POTASSIUM BLD-SCNC: 4.6 MMOL/L (ref 3.5–5)
PROT/CREAT 24H UR: 0.07 MG/MG CR
PTH-INTACT SERPL-MCNC: 109 PG/ML (ref 10–86)
SODIUM SERPL-SCNC: 141 MMOL/L (ref 136–145)

## 2022-01-19 PROCEDURE — 83970 ASSAY OF PARATHORMONE: CPT

## 2022-01-19 PROCEDURE — 84156 ASSAY OF PROTEIN URINE: CPT

## 2022-01-19 PROCEDURE — 80069 RENAL FUNCTION PANEL: CPT

## 2022-01-19 PROCEDURE — 36415 COLL VENOUS BLD VENIPUNCTURE: CPT

## 2022-01-19 PROCEDURE — 82306 VITAMIN D 25 HYDROXY: CPT

## 2022-01-20 LAB — DEPRECATED CALCIDIOL+CALCIFEROL SERPL-MC: 49 UG/L (ref 30–80)

## 2022-09-11 ENCOUNTER — HEALTH MAINTENANCE LETTER (OUTPATIENT)
Age: 77
End: 2022-09-11

## 2022-10-27 DIAGNOSIS — N18.31 CHRONIC KIDNEY DISEASE, STAGE 3A (H): Primary | ICD-10-CM

## 2022-11-18 ENCOUNTER — LAB (OUTPATIENT)
Dept: LAB | Facility: CLINIC | Age: 77
End: 2022-11-18
Payer: COMMERCIAL

## 2022-11-18 DIAGNOSIS — N18.31 CHRONIC KIDNEY DISEASE, STAGE 3A (H): ICD-10-CM

## 2022-11-18 LAB
ERYTHROCYTE [DISTWIDTH] IN BLOOD BY AUTOMATED COUNT: 12.6 % (ref 10–15)
HCT VFR BLD AUTO: 39.7 % (ref 40–53)
HGB BLD-MCNC: 13.5 G/DL (ref 13.3–17.7)
MCH RBC QN AUTO: 35.2 PG (ref 26.5–33)
MCHC RBC AUTO-ENTMCNC: 34 G/DL (ref 31.5–36.5)
MCV RBC AUTO: 103 FL (ref 78–100)
PLATELET # BLD AUTO: 374 10E3/UL (ref 150–450)
RBC # BLD AUTO: 3.84 10E6/UL (ref 4.4–5.9)
WBC # BLD AUTO: 7.4 10E3/UL (ref 4–11)

## 2022-11-18 PROCEDURE — 82306 VITAMIN D 25 HYDROXY: CPT

## 2022-11-18 PROCEDURE — 84156 ASSAY OF PROTEIN URINE: CPT

## 2022-11-18 PROCEDURE — 83970 ASSAY OF PARATHORMONE: CPT

## 2022-11-18 PROCEDURE — 36415 COLL VENOUS BLD VENIPUNCTURE: CPT

## 2022-11-18 PROCEDURE — 85027 COMPLETE CBC AUTOMATED: CPT

## 2022-11-18 PROCEDURE — 80069 RENAL FUNCTION PANEL: CPT

## 2022-11-19 LAB
ALBUMIN MFR UR ELPH: 8.7 MG/DL
ALBUMIN SERPL BCG-MCNC: 4.9 G/DL (ref 3.5–5.2)
ANION GAP SERPL CALCULATED.3IONS-SCNC: 18 MMOL/L (ref 7–15)
BUN SERPL-MCNC: 19.8 MG/DL (ref 8–23)
CALCIUM SERPL-MCNC: 9.9 MG/DL (ref 8.8–10.2)
CHLORIDE SERPL-SCNC: 100 MMOL/L (ref 98–107)
CREAT SERPL-MCNC: 1.14 MG/DL (ref 0.67–1.17)
CREAT UR-MCNC: 71.3 MG/DL
DEPRECATED CALCIDIOL+CALCIFEROL SERPL-MC: 45 UG/L (ref 20–75)
DEPRECATED HCO3 PLAS-SCNC: 22 MMOL/L (ref 22–29)
GFR SERPL CREATININE-BSD FRML MDRD: 67 ML/MIN/1.73M2
GLUCOSE SERPL-MCNC: 99 MG/DL (ref 70–99)
PHOSPHATE SERPL-MCNC: 3.2 MG/DL (ref 2.5–4.5)
POTASSIUM SERPL-SCNC: 4.6 MMOL/L (ref 3.4–5.3)
PROT/CREAT 24H UR: 0.12 MG/MG CR (ref 0–0.2)
PTH-INTACT SERPL-MCNC: 46 PG/ML (ref 15–65)
SODIUM SERPL-SCNC: 140 MMOL/L (ref 136–145)

## 2023-01-05 ENCOUNTER — TRANSFERRED RECORDS (OUTPATIENT)
Dept: HEALTH INFORMATION MANAGEMENT | Facility: CLINIC | Age: 78
End: 2023-01-05

## 2023-01-09 ENCOUNTER — OFFICE VISIT (OUTPATIENT)
Dept: FAMILY MEDICINE | Facility: CLINIC | Age: 78
End: 2023-01-09
Payer: COMMERCIAL

## 2023-01-09 VITALS
BODY MASS INDEX: 26.95 KG/M2 | HEIGHT: 68 IN | WEIGHT: 177.8 LBS | OXYGEN SATURATION: 98 % | HEART RATE: 83 BPM | DIASTOLIC BLOOD PRESSURE: 76 MMHG | SYSTOLIC BLOOD PRESSURE: 138 MMHG

## 2023-01-09 DIAGNOSIS — Z11.59 NEED FOR HEPATITIS C SCREENING TEST: ICD-10-CM

## 2023-01-09 DIAGNOSIS — M51.369 DEGENERATION OF INTERVERTEBRAL DISC OF LUMBAR REGION: ICD-10-CM

## 2023-01-09 DIAGNOSIS — Z13.220 SCREENING FOR HYPERLIPIDEMIA: ICD-10-CM

## 2023-01-09 DIAGNOSIS — I10 ESSENTIAL HYPERTENSION: ICD-10-CM

## 2023-01-09 DIAGNOSIS — Z00.00 ENCOUNTER FOR MEDICARE ANNUAL WELLNESS EXAM: Primary | ICD-10-CM

## 2023-01-09 DIAGNOSIS — R05.3 CHRONIC COUGH: ICD-10-CM

## 2023-01-09 DIAGNOSIS — N18.31 STAGE 3A CHRONIC KIDNEY DISEASE (H): ICD-10-CM

## 2023-01-09 DIAGNOSIS — N52.9 ERECTILE DYSFUNCTION, UNSPECIFIED ERECTILE DYSFUNCTION TYPE: ICD-10-CM

## 2023-01-09 PROBLEM — M54.42 BILATERAL LOW BACK PAIN WITH LEFT-SIDED SCIATICA: Status: RESOLVED | Noted: 2020-09-14 | Resolved: 2023-01-09

## 2023-01-09 LAB
CHOLEST SERPL-MCNC: 255 MG/DL
HDLC SERPL-MCNC: 80 MG/DL
LDLC SERPL CALC-MCNC: 159 MG/DL
NONHDLC SERPL-MCNC: 175 MG/DL
TRIGL SERPL-MCNC: 79 MG/DL

## 2023-01-09 PROCEDURE — 36415 COLL VENOUS BLD VENIPUNCTURE: CPT | Performed by: FAMILY MEDICINE

## 2023-01-09 PROCEDURE — 86803 HEPATITIS C AB TEST: CPT | Performed by: FAMILY MEDICINE

## 2023-01-09 PROCEDURE — 80061 LIPID PANEL: CPT | Performed by: FAMILY MEDICINE

## 2023-01-09 PROCEDURE — 99214 OFFICE O/P EST MOD 30 MIN: CPT | Mod: 25 | Performed by: FAMILY MEDICINE

## 2023-01-09 PROCEDURE — G0439 PPPS, SUBSEQ VISIT: HCPCS | Performed by: FAMILY MEDICINE

## 2023-01-09 RX ORDER — UBIDECARENONE 75 MG
100 CAPSULE ORAL DAILY
COMMUNITY

## 2023-01-09 RX ORDER — METOPROLOL SUCCINATE 100 MG/1
100 TABLET, EXTENDED RELEASE ORAL DAILY
Qty: 90 TABLET | Refills: 1 | Status: SHIPPED | OUTPATIENT
Start: 2023-01-09 | End: 2023-01-10

## 2023-01-09 ASSESSMENT — ENCOUNTER SYMPTOMS
SORE THROAT: 0
HEARTBURN: 0
CONSTIPATION: 0
HEMATURIA: 0
FEVER: 0
PARESTHESIAS: 0
NERVOUS/ANXIOUS: 0
CHILLS: 0
EYE PAIN: 0
HEADACHES: 0
ABDOMINAL PAIN: 0
COUGH: 1
MYALGIAS: 0
FREQUENCY: 0
HEMATOCHEZIA: 0
DIZZINESS: 1
DYSURIA: 0
PALPITATIONS: 0
NAUSEA: 0
SHORTNESS OF BREATH: 0
WEAKNESS: 0
JOINT SWELLING: 1
ARTHRALGIAS: 1
DIARRHEA: 0

## 2023-01-09 ASSESSMENT — ACTIVITIES OF DAILY LIVING (ADL): CURRENT_FUNCTION: NO ASSISTANCE NEEDED

## 2023-01-09 NOTE — PROGRESS NOTES
"SUBJECTIVE:   Crispin is a 77 year old who presents for Preventive Visit.    HPI: Crispin is a pleasant 77 year old gentleman presenting today for an annual wellness visit and a medication check. He has a couple of concerns he would like to address today. First, he reports ongoing issues with chronic cough. He brought this up last year, but had a normal lung exam and chest xray and was going to monitor it. However, he reports that he is now having sputum production daily with the cough. He denies associated shortness of breath or wheezing. He denies any chest pain. He also would like to switch pharmacies and would like refills on all his medications. He is pleased that his kidney function has improved quite a bit. However, he has been monitoring his blood pressures at home and they have been frequently in the high 130's and sometimes 140's, with rare blood pressure below 130 systolic.     Patient has been advised of split billing requirements and indicates understanding: Yes  Are you in the first 12 months of your Medicare coverage?  No    Healthy Habits:     In general, how would you rate your overall health?  Good    Frequency of exercise:  2-3 days/week    Duration of exercise:  45-60 minutes    Do you usually eat at least 4 servings of fruit and vegetables a day, include whole grains    & fiber and avoid regularly eating high fat or \"junk\" foods?  Yes    Taking medications regularly:  Yes    Medication side effects:  None    Ability to successfully perform activities of daily living:  No assistance needed    Home Safety:  No safety concerns identified    Hearing Impairment:  No hearing concerns    In the past 6 months, have you been bothered by leaking of urine?  No    In general, how would you rate your overall mental or emotional health?  Good      PHQ-2 Total Score: 0    Additional concerns today:  Yes      Have you ever done Advance Care Planning? (For example, a Health Directive, POLST, or a discussion with a " medical provider or your loved ones about your wishes): Yes, advance care planning is on file.       Fall risk  Fallen 2 or more times in the past year?: Yes  Any fall with injury in the past year?: Yes    Cognitive Screening   1) Repeat 3 items (Leader, Season, Table)    2) Clock draw: NORMAL  3) 3 item recall: Recalls 3 objects  Results: 3 items recalled: COGNITIVE IMPAIRMENT LESS LIKELY    Mini-CogTM Copyright DOUGIE Robles. Licensed by the author for use in [facility]; reprinted with permission (penny@[facility]). All rights reserved.      Do you have sleep apnea, excessive snoring or daytime drowsiness?: no    Reviewed and updated as needed this visit by clinical staff    Allergies               Reviewed and updated as needed this visit by Provider                 Social History     Tobacco Use     Smoking status: Former     Smokeless tobacco: Never   Substance Use Topics     Alcohol use: Yes     Alcohol/week: 3.0 standard drinks         Alcohol Use 1/9/2023   Prescreen: >3 drinks/day or >7 drinks/week? No   Prescreen: >3 drinks/day or >7 drinks/week? -   AUDIT SCORE  -     AUDIT - Alcohol Use Disorders Identification Test - Reproduced from the World Health Organization Audit 2001 (Second Edition) 10/20/2021   1.  How often do you have a drink containing alcohol? 4 or more times a week   2.  How many drinks containing alcohol do you have on a typical day when you are drinking? 1 or 2   3.  How often do you have five or more drinks on one occasion? Never   4.  How often during the last year have you found that you were not able to stop drinking once you had started? Never   5.  How often during the last year have you failed to do what was normally expected of you because of drinking? Never   6.  How often during the last year have you needed a first drink in the morning to get yourself going after a heavy drinking session? Never   7.  How often during the last year have you had a feeling of guilt or remorse  after drinking? Never   8.  How often during the last year have you been unable to remember what happened the night before because of your drinking? Never   9.  Have you or someone else been injured because of your drinking? No   10. Has a relative, friend, doctor or other health care worker been concerned about your drinking or suggested you cut down? No   TOTAL SCORE 4         Current providers sharing in care for this patient include:   Patient Care Team:  Diana Bess MD as PCP - General (Family Medicine)  Diana Bess MD as Referring Physician (Family Practice)  Nissen, Rick L, MD as MD (Otolaryngology)  Katarzyna Mcdonough AuD as Audiologist (Audiology)  Diana Bess MD as Assigned PCP    The following health maintenance items are reviewed in Epic and correct as of today:  Health Maintenance   Topic Date Due     HEPATITIS C SCREENING  Never done     BMP  11/18/2023     MICROALBUMIN  11/18/2023     HEMOGLOBIN  11/18/2023     MEDICARE ANNUAL WELLNESS VISIT  01/09/2024     LIPID  01/09/2024     ANNUAL REVIEW OF HM ORDERS  01/09/2024     FALL RISK ASSESSMENT  01/09/2024     DTAP/TDAP/TD IMMUNIZATION (2 - Td or Tdap) 12/26/2024     ADVANCE CARE PLANNING  01/09/2028     PHQ-2 (once per calendar year)  Completed     INFLUENZA VACCINE  Completed     Pneumococcal Vaccine: 65+ Years  Completed     URINALYSIS  Completed     ZOSTER IMMUNIZATION  Completed     COVID-19 Vaccine  Completed     IPV IMMUNIZATION  Aged Out     MENINGITIS IMMUNIZATION  Aged Out     COLORECTAL CANCER SCREENING  Discontinued     LUNG CANCER SCREENING  Discontinued     Patient Active Problem List   Diagnosis     Degeneration of intervertebral disc of lumbar region     Essential hypertension     Stage 3a chronic kidney disease (H)     Family history of colon cancer     Chronic cough     Erectile dysfunction, unspecified erectile dysfunction type     Past Surgical History:   Procedure Laterality Date     ARTHROPLASTY SHOULDER  Left 8/23/2016    Procedure: ARTHROPLASTY SHOULDER;  Surgeon: Varinder Perez MD;  Location: [facility] OR     OPEN REDUCTION INTERNAL FIXATION ANKLE Right 1973       Social History     Tobacco Use     Smoking status: Former     Smokeless tobacco: Never   Substance Use Topics     Alcohol use: Yes     Alcohol/week: 3.0 standard drinks     Family History   Problem Relation Age of Onset     Coronary Artery Disease Father          Current Outpatient Medications   Medication Sig Dispense Refill     amLODIPine (NORVASC) 10 MG tablet Take 1 tablet (10 mg) by mouth daily 90 tablet 3     cyanocobalamin (VITAMIN B-12) 100 MCG tablet Take 100 mcg by mouth daily       folic acid (FOLVITE) 1 MG tablet Take 1 tablet by mouth       gabapentin (NEURONTIN) 300 MG capsule Take 2 capsules (600 mg) by mouth 3 times daily 360 capsule 3     metoprolol succinate ER (TOPROL XL) 100 MG 24 hr tablet Take 1 tablet (100 mg) by mouth daily 90 tablet 0     Omega-3 Fatty Acids (OMEGA-3 FISH OIL PO) Take 1,200 mg by mouth daily        sildenafil (REVATIO) 20 MG tablet Take 3-5 tablets PO 1 hour prior to intercourse Strength: 20 mg 40 tablet 4     sodium bicarbonate 325 MG tablet Take 1 tablet (325 mg) by mouth 2 times daily 180 tablet 3     vitamin B complex with vitamin C (STRESS TAB) tablet Take 2 tablets by mouth daily       VITAMIN D, CHOLECALCIFEROL, PO Take 2,000 Units by mouth daily       No Known Allergies  Pneumonia Vaccine:For adults 65 years or older who do not have an immunocompromising condition, cerebrospinal fluid leak, or cochlear implant and want to receive PCV13 AND PPSV23: Administer 1 dose of PCV13 first then give 1 dose of PPSV23 at least 1 year later. If the patient already received PPSV23, give the dose of PCV13 at least 1 year after they received the most recent dose of PPSV23. Anyone who received any doses of PPSV23 before age 65 should receive 1 final dose of the vaccine at age 65 or older. Administer this last dose at least  "5 years after the prior PPSV23 dose.        Review of Systems  Constitutional, HEENT, cardiovascular, pulmonary, gi and gu systems are negative, except as otherwise noted.    OBJECTIVE:   /76 (BP Location: Left arm, Patient Position: Left side, Cuff Size: Adult Large)   Pulse 83   Ht 1.727 m (5' 8\")   Wt 80.6 kg (177 lb 12.8 oz)   SpO2 98%   BMI 27.03 kg/m   Estimated body mass index is 27.03 kg/m  as calculated from the following:    Height as of this encounter: 1.727 m (5' 8\").    Weight as of this encounter: 80.6 kg (177 lb 12.8 oz).  Physical Exam  GENERAL: healthy, alert and no distress  EYES: Eyes grossly normal to inspection, PERRL and conjunctivae and sclerae normal  HENT: ear canals and TM's normal, nose and mouth without ulcers or lesions  NECK: no adenopathy, no asymmetry, masses, or scars and thyroid normal to palpation  RESP: lungs clear to auscultation - no rales, rhonchi or wheezes  CV: regular rate and rhythm, normal S1 S2, no S3 or S4, no murmur, click or rub, no peripheral edema and peripheral pulses strong  ABDOMEN: soft, nontender, no hepatosplenomegaly, no masses and bowel sounds normal  MS: no gross musculoskeletal defects noted, no edema  SKIN: no suspicious lesions or rashes  NEURO: Normal strength and tone, mentation intact and speech normal  PSYCH: mentation appears normal, affect normal/bright    Labs reviewed in Epic    ASSESSMENT / PLAN:     Problem List Items Addressed This Visit        Respiratory    Chronic cough     Some worsening of symptoms since last year, when he had a negative chest xray. Unclear etiology and recommended further evaluation and referral placed to pulmonary.          Relevant Orders    Adult Pulmonary Medicine Referral       Circulatory    Essential hypertension     Blood pressure borderline today and on home readings; recommended continue Amlodipine 10mg daily and prescribed Metoprolol XL 100mg daily. He should stop Atenolol and recheck in 4 weeks for " "blood pressure recheck.          Relevant Medications    amLODIPine (NORVASC) 10 MG tablet    metoprolol succinate ER (TOPROL XL) 100 MG 24 hr tablet       Musculoskeletal and Integumentary    Degeneration of intervertebral disc of lumbar region     Patient continues to have some back pain, but minimal radiculopathy. He continues at this time on Gabapentin 4-6 per day. Suggested he should try backing off on the gabapentin to evaluate if he continues to have a need for it.          Relevant Medications    gabapentin (NEURONTIN) 300 MG capsule       Urinary    Stage 3a chronic kidney disease (H)     Continues to see nephrologist; recent FGR has improved.          Relevant Medications    sodium bicarbonate 325 MG tablet       Other    Erectile dysfunction, unspecified erectile dysfunction type     Refill requested on his Sildenafil.          Relevant Medications    sildenafil (REVATIO) 20 MG tablet   Other Visit Diagnoses     Encounter for Medicare annual wellness exam    -  Primary    Need for hepatitis C screening test        Relevant Orders    Hepatitis C Screen Reflex to HCV RNA Quant and Genotype    Screening for hyperlipidemia        Relevant Orders    Lipid panel reflex to direct LDL Non-fasting (Completed)              COUNSELING:  Reviewed preventive health counseling, as reflected in patient instructions       Regular exercise       Healthy diet/nutrition       Aspirin prophylaxis        Hepatitis C screening       Colon cancer screening      BMI:   Estimated body mass index is 27.03 kg/m  as calculated from the following:    Height as of this encounter: 1.727 m (5' 8\").    Weight as of this encounter: 80.6 kg (177 lb 12.8 oz).         He reports that he has quit smoking. He has never used smokeless tobacco.      Appropriate preventive services were discussed with this patient, including applicable screening as appropriate for cardiovascular disease, diabetes, osteopenia/osteoporosis, and glaucoma.  As " appropriate for age/gender, discussed screening for colorectal cancer, prostate cancer, breast cancer, and cervical cancer. Checklist reviewing preventive services available has been given to the patient.    Reviewed patients plan of care and provided an AVS. The Basic Care Plan (routine screening as documented in Health Maintenance) for Crispin meets the Care Plan requirement. This Care Plan has been established and reviewed with the Patient.      DIEGO CHOWDHURY MD  [facility]    Identified Health Risks:

## 2023-01-09 NOTE — PATIENT INSTRUCTIONS
Patient Education   Personalized Prevention Plan  You are due for the preventive services outlined below.  Your care team is available to assist you in scheduling these services.  If you have already completed any of these items, please share that information with your care team to update in your medical record.  Health Maintenance Due   Topic Date Due     ANNUAL REVIEW OF  ORDERS  Never done     Hepatitis C Screening  Never done     LUNG CANCER SCREENING  Never done     Cholesterol Lab  09/14/2021       Preventing Falls at Home  A person can fall for many reasons. Older adults may fall because reaction time slows down as we age. Your muscles and joints may get stiff, weak, or less flexible because of illness, medicines, or a physical condition.   Other health problems that make falls more likely include:     Arthritis    Dizziness or lightheadedness when you stand up (orthostatic hypotension)    History of a stroke    Dizziness    Anemia    Certain medicines taken for mental illness or to control blood pressure.    Problems with balance or gait    Bladder or urinary problems    History of falling    Changes in vision (vision impairment)    Changes in thinking skills and memory (cognitive impairment)  Injuries from a fall can include serious injuries such as broken bones, dislocated joints, internal bleeding and cuts. Injuries like these can limit your independence.   Prevention tips  To help prevent falls and fall-related injuries, follow the tips below.    Floors  To make floors safer:     Put nonskid pads under area rugs.    Remove small rugs.    Replace worn floor coverings.    Tack carpets firmly to each step on carpeted stairs. Put nonskid strips on the edges of uncarpeted stairs.    Keep floors and stairs free of clutter and cords.    Arrange furniture so there are clear pathways.    Clean up any spills right away.  Bathrooms    To make bathrooms safer:     Install grab bars in the tub or shower.    Apply  nonskid strips or put a nonskid rubber mat in the tub or shower.    Sit on a bath chair to bathe.    Use bathmats with nonskid backing.  Lighting  To improve visibility in your home:      Keep a flashlight in each room. Or put a lamp next to the bed within easy reach.    Put nightlights in the bedrooms, hallways, kitchen, and bathrooms.    Make sure all stairways have good lighting.    Take your time when going up and down stairs.    Put handrails on both sides of stairs and in walkways for more support. To prevent injury to your wrist or arm, don t use handrails to pull yourself up.    Install grab bars to pull yourself up.    Move or rearrange items that you use often. This will make them easier to find or reach.    Look at your home to find any safety hazards. Especially look at doorways, walkways, and the driveway. Remove or repair any safety problems that you find.  Other changes to make    Look around to find any safety hazards. Look closely at doorways, walkways, and the driveway. Remove or repair any safety problems that you find.    Wear shoes that fit well.    Take your time when going up and down stairs.    Put handrails on both sides of stairs and in walkways for more support. To prevent injury to your wrist or arm, don t use handrails to pull yourself up.    Install grab bars wherever needed to pull yourself up.    Arrange items that you use often. This will make them easier to find or reach.    [employer] last reviewed this educational content on 3/1/2020    7498-1051 [employer]. All rights reserved. This information is not intended as a substitute for professional medical care. Always follow your healthcare professional's instructions.

## 2023-01-09 NOTE — ASSESSMENT & PLAN NOTE
Blood pressure borderline today and on home readings; recommended continue Amlodipine 10mg daily and prescribed Metoprolol XL 100mg daily. He should stop Atenolol and recheck in 4 weeks for blood pressure recheck.

## 2023-01-10 ENCOUNTER — TELEPHONE (OUTPATIENT)
Dept: FAMILY MEDICINE | Facility: CLINIC | Age: 78
End: 2023-01-10

## 2023-01-10 PROBLEM — N52.9 ERECTILE DYSFUNCTION, UNSPECIFIED ERECTILE DYSFUNCTION TYPE: Status: ACTIVE | Noted: 2023-01-10

## 2023-01-10 PROBLEM — R05.3 CHRONIC COUGH: Status: ACTIVE | Noted: 2023-01-10

## 2023-01-10 LAB — HCV AB SERPL QL IA: NONREACTIVE

## 2023-01-10 RX ORDER — GABAPENTIN 300 MG/1
600 CAPSULE ORAL 3 TIMES DAILY
Qty: 360 CAPSULE | Refills: 3 | Status: SHIPPED | OUTPATIENT
Start: 2023-01-10 | End: 2024-02-22

## 2023-01-10 RX ORDER — SODIUM BICARBONATE 325 MG/1
325 TABLET ORAL 2 TIMES DAILY
Qty: 180 TABLET | Refills: 3 | Status: SHIPPED | OUTPATIENT
Start: 2023-01-10

## 2023-01-10 RX ORDER — METOPROLOL SUCCINATE 100 MG/1
100 TABLET, EXTENDED RELEASE ORAL DAILY
Qty: 90 TABLET | Refills: 0 | Status: SHIPPED | OUTPATIENT
Start: 2023-01-10 | End: 2023-09-21

## 2023-01-10 RX ORDER — SILDENAFIL CITRATE 20 MG/1
TABLET ORAL
Qty: 40 TABLET | Refills: 4 | Status: SHIPPED | OUTPATIENT
Start: 2023-01-10

## 2023-01-10 RX ORDER — AMLODIPINE BESYLATE 10 MG/1
10 TABLET ORAL DAILY
Qty: 90 TABLET | Refills: 3 | Status: SHIPPED | OUTPATIENT
Start: 2023-01-10 | End: 2024-02-22

## 2023-01-10 NOTE — TELEPHONE ENCOUNTER
PRIOR AUTHORIZATION DENIED    Medication: sildenafil (REVATIO) 20 MG tablet - EPA DENIED    Denial Date: 1/10/2023    Denial Rational:       Appeal Information:

## 2023-01-10 NOTE — ASSESSMENT & PLAN NOTE
Some worsening of symptoms since last year, when he had a negative chest xray. Unclear etiology and recommended further evaluation and referral placed to pulmonary.

## 2023-01-18 DIAGNOSIS — R05.3 CHRONIC COUGH: Primary | ICD-10-CM

## 2023-02-08 ENCOUNTER — HOSPITAL ENCOUNTER (OUTPATIENT)
Dept: RESPIRATORY THERAPY | Facility: CLINIC | Age: 78
Discharge: HOME | End: 2023-02-08
Attending: FAMILY MEDICINE | Admitting: FAMILY MEDICINE
Payer: COMMERCIAL

## 2023-02-08 DIAGNOSIS — R05.3 CHRONIC COUGH: Primary | ICD-10-CM

## 2023-02-08 LAB — HGB BLD-MCNC: 13.5 G/DL (ref 13.3–17.7)

## 2023-02-08 PROCEDURE — 94729 DIFFUSING CAPACITY: CPT | Performed by: INTERNAL MEDICINE

## 2023-02-08 PROCEDURE — 999N000157 HC STATISTIC RCP TIME EA 10 MIN

## 2023-02-08 PROCEDURE — 94726 PLETHYSMOGRAPHY LUNG VOLUMES: CPT | Performed by: INTERNAL MEDICINE

## 2023-02-08 PROCEDURE — 94729 DIFFUSING CAPACITY: CPT

## 2023-02-08 PROCEDURE — 250N000009 HC RX 250: Performed by: INTERNAL MEDICINE

## 2023-02-08 PROCEDURE — 36415 COLL VENOUS BLD VENIPUNCTURE: CPT | Performed by: INTERNAL MEDICINE

## 2023-02-08 PROCEDURE — 85018 HEMOGLOBIN: CPT | Performed by: INTERNAL MEDICINE

## 2023-02-08 PROCEDURE — 94060 EVALUATION OF WHEEZING: CPT

## 2023-02-08 PROCEDURE — 94726 PLETHYSMOGRAPHY LUNG VOLUMES: CPT

## 2023-02-08 PROCEDURE — 94060 EVALUATION OF WHEEZING: CPT | Performed by: INTERNAL MEDICINE

## 2023-02-08 RX ORDER — ALBUTEROL SULFATE 0.83 MG/ML
2.5 SOLUTION RESPIRATORY (INHALATION) ONCE
Status: COMPLETED | OUTPATIENT
Start: 2023-02-08 | End: 2023-02-08

## 2023-02-08 RX ADMIN — ALBUTEROL SULFATE 2.5 MG: 2.5 SOLUTION RESPIRATORY (INHALATION) at 14:44

## 2023-02-08 NOTE — PROGRESS NOTES
RESPIRATORY CARE NOTE     Patient Name: Crispin Farr  Today's Date: 2/8/2023     Complete PFT done. Pt performed tests with good effort.   Alb neb given.  Test results meet ATS criteria except for PRE FVC, pt coughing. Results scanned into epic. Pt left in no distress.     Caro Grayson, RT

## 2023-02-09 ENCOUNTER — OFFICE VISIT (OUTPATIENT)
Dept: PULMONOLOGY | Facility: CLINIC | Age: 78
End: 2023-02-09
Attending: FAMILY MEDICINE
Payer: COMMERCIAL

## 2023-02-09 VITALS
HEART RATE: 80 BPM | DIASTOLIC BLOOD PRESSURE: 80 MMHG | OXYGEN SATURATION: 97 % | SYSTOLIC BLOOD PRESSURE: 134 MMHG | WEIGHT: 171 LBS | HEIGHT: 68 IN | BODY MASS INDEX: 25.91 KG/M2

## 2023-02-09 DIAGNOSIS — R05.3 CHRONIC COUGH: ICD-10-CM

## 2023-02-09 LAB
DLCOCOR-%PRED-PRE: 102 %
DLCOCOR-PRE: 24.63 ML/MIN/MMHG
DLCOUNC-%PRED-PRE: 98 %
DLCOUNC-PRE: 23.83 ML/MIN/MMHG
DLCOUNC-PRED: 24.07 ML/MIN/MMHG
ERV-%PRED-PRE: 76 %
ERV-PRE: 0.81 L
ERV-PRED: 1.07 L
EXPTIME-PRE: 6.92 SEC
FEF2575-%PRED-POST: 141 %
FEF2575-%PRED-PRE: 137 %
FEF2575-POST: 2.82 L/SEC
FEF2575-PRE: 2.72 L/SEC
FEF2575-PRED: 1.99 L/SEC
FEFMAX-%PRED-PRE: 114 %
FEFMAX-PRE: 8.45 L/SEC
FEFMAX-PRED: 7.38 L/SEC
FEV1-%PRED-PRE: 121 %
FEV1-PRE: 3.27 L
FEV1FEV6-PRE: 77 %
FEV1FEV6-PRED: 77 %
FEV1FVC-PRE: 76 %
FEV1FVC-PRED: 76 %
FEV1SVC-PRE: 73 %
FEV1SVC-PRED: 61 %
FIFMAX-PRE: 5.2 L/SEC
FRCPLETH-%PRED-PRE: 107 %
FRCPLETH-PRE: 3.97 L
FRCPLETH-PRED: 3.7 L
FVC-%PRED-PRE: 120 %
FVC-PRE: 4.28 L
FVC-PRED: 3.55 L
IC-%PRED-PRE: 109 %
IC-PRE: 3.67 L
IC-PRED: 3.36 L
RVPLETH-%PRED-PRE: 114 %
RVPLETH-PRE: 3.16 L
RVPLETH-PRED: 2.76 L
TLCPLETH-%PRED-PRE: 110 %
TLCPLETH-PRE: 7.63 L
TLCPLETH-PRED: 6.92 L
VA-%PRED-PRE: 113 %
VA-PRE: 6.95 L
VC-%PRED-PRE: 101 %
VC-PRE: 4.48 L
VC-PRED: 4.42 L

## 2023-02-09 PROCEDURE — 99204 OFFICE O/P NEW MOD 45 MIN: CPT | Performed by: INTERNAL MEDICINE

## 2023-02-09 NOTE — PROGRESS NOTES
CCx:Chronic cough    HPI:Mr. Farr is a 77 year old gentleman with a past medical history significant for mild anemia, chronic low back pain, HTN, lumbar scoliosis who presents to clinic for med check chief complaint.  He states that he has struggled with a chronic cough for the last many years and describes this as being productive of thick brown/gray-colored mucus.  He particularly expectorates when he is walking around and increase his physical activity.  He denies any shortness of breath, postnasal drip, chest pain, chest tightness, fevers, chills, night sweats or change in his weight.  Separately he states that he has been on gabapentin for many years and this was initiated to help with back pain but that he and his primary care provider have been talking and he is being weaned off this.    ROS:  Pertinent positives alluded to in the HPI. Remainder of 10 point ROS is negative.     PMH:  1. Mild anemia  2. Chronic low back pain  3. HTN  4. Lumbar scoliosis     PSH:  1. Left shoulder arthroplasty  2. Right ORIF    Allergies:  Reviewed.     Family HX:  1. Father: CAD, Alzheimer's dementia    Social Hx:  Marital status:   Resides in a house built in 1977, no concern for mold.  Occupational history: Retired    service: Yes served in [employer] and [employer]  Pets: 2 dogs  Smoking history: 1 pack year history  Alcohol use: Socially  Recreational drug use: No  Hobbies: Hunting, fishing, shooting  Recent Travel: [place] in December    Current Meds:  Current Outpatient Medications   Medication Sig Dispense Refill     amLODIPine (NORVASC) 10 MG tablet Take 1 tablet (10 mg) by mouth daily 90 tablet 3     cyanocobalamin (VITAMIN B-12) 100 MCG tablet Take 100 mcg by mouth daily       folic acid (FOLVITE) 1 MG tablet Take 1 tablet by mouth       metoprolol succinate ER (TOPROL XL) 100 MG 24 hr tablet Take 1 tablet (100 mg) by mouth daily 90 tablet 0     Omega-3 Fatty Acids (OMEGA-3 FISH OIL PO) Take 1,200  "mg by mouth daily        sildenafil (REVATIO) 20 MG tablet Take 3-5 tablets PO 1 hour prior to intercourse Strength: 20 mg 40 tablet 4     sodium bicarbonate 325 MG tablet Take 1 tablet (325 mg) by mouth 2 times daily 180 tablet 3     vitamin B complex with vitamin C (STRESS TAB) tablet Take 2 tablets by mouth daily       VITAMIN D, CHOLECALCIFEROL, PO Take 2,000 Units by mouth daily       gabapentin (NEURONTIN) 300 MG capsule Take 2 capsules (600 mg) by mouth 3 times daily 360 capsule 3       Labs:  Reviewed in epic.    Imaging studies:  CXR 10/20/21:  Some minimal fibrosis left lung base of no significance. Chest otherwise negative. Left shoulder arthroplasty.    PFT's 2/8/23  FEV1/FVC is 76% and is normal.  FEV1 is 3.27L (121%) predicted and is normal.  FVC is 4.28L (120%) predicted and normal.  There was no improvement in spirometry after a single inhaled dose of bronchodilator.  TLC is 7.63L (110%) predicted and is normal.  RV is 3.16L (114%) predicted and is normal.  DLCO is 24.63ml/min/hg (102%) predicted and is normal when it is corrected for hemoglobin.  Flow volume loops indicate no abnormalities.    Impression:  Full Pulmonary Function Test is normal.  PFTs are consistent with no restrictive obstructive disease.  Spirometry is not consistent with reversibility.  There is no hyperinflation.  There is no air-trapping.  Diffusion capacity when corrected for hemoglobin is normal.    Physical Exam:  /80 (BP Location: Left arm, Patient Position: Chair, Cuff Size: Adult Regular)   Pulse 80   Ht 1.727 m (5' 8\")   Wt 77.6 kg (171 lb)   SpO2 97%   BMI 26.00 kg/m    GEN: NAD  HEENT: PERRL, No JVD  LUNGS: CTA BL  CVS: S1 & S2 no added sounds  ABD: No HSM, BS audible  EXT: No edema, no rashes  NEURO: AO*3 CN2-12 intact      Assessment and Plan:Crispin Farr is a 77 year old with a past medical history significant for  mild anemia, chronic low back pain, HTN, lumbar scoliosis who presents to clinic today for " evaluation of a chronic productive cough.  His pulmonary function testing is unremarkable and his only chest imaging is a chest x-ray from almost 2 years ago.  I wonder if he has bronchiectasis which is leading to increased mucus production or if he has an allergic component to his symptoms and has asthma with increased peribronchovascular cuffing. For the present we would recommend;    1. Chronic productive cough: May be secondary to bronchiectasis versus asthma.    I explained my pathophysiological thought process to Dr. Farr who agrees with undergoing a high-resolution CT scan to rule out anatomical reasons for hypersecretion of mucus.    I will obtain a CBC with a differential.  2. HCM: Up-to-date with influenza and COVID vaccines.  3. Follow-up: I will call him about the results of his blood tests and chest imaging we will formulate a plan based on this.  If in fact he does have bronchiectasis, I have explained that I would likely initiate him on nebulized bronchodilators with flutter valve therapy.      Cheryl Villareal  Pulmonary and Critical Care  [phone]

## 2023-02-16 ENCOUNTER — HOSPITAL ENCOUNTER (OUTPATIENT)
Dept: CT IMAGING | Facility: HOSPITAL | Age: 78
Discharge: HOME | End: 2023-02-16
Attending: INTERNAL MEDICINE | Admitting: INTERNAL MEDICINE
Payer: COMMERCIAL

## 2023-02-16 DIAGNOSIS — R05.3 CHRONIC COUGH: ICD-10-CM

## 2023-02-16 PROCEDURE — 71250 CT THORAX DX C-: CPT

## 2023-03-01 ENCOUNTER — TELEPHONE (OUTPATIENT)
Dept: FAMILY MEDICINE | Facility: CLINIC | Age: 78
End: 2023-03-01

## 2023-03-01 ENCOUNTER — ALLIED HEALTH/NURSE VISIT (OUTPATIENT)
Dept: FAMILY MEDICINE | Facility: CLINIC | Age: 78
End: 2023-03-01
Payer: COMMERCIAL

## 2023-03-01 VITALS — SYSTOLIC BLOOD PRESSURE: 145 MMHG | DIASTOLIC BLOOD PRESSURE: 80 MMHG | HEART RATE: 71 BPM

## 2023-03-01 DIAGNOSIS — I10 ESSENTIAL HYPERTENSION: Primary | ICD-10-CM

## 2023-03-01 PROCEDURE — 99207 PR NO CHARGE NURSE ONLY: CPT

## 2023-03-01 RX ORDER — METOPROLOL SUCCINATE 200 MG/1
200 TABLET, EXTENDED RELEASE ORAL DAILY
Qty: 30 TABLET | Refills: 1 | Status: SHIPPED | OUTPATIENT
Start: 2023-03-01 | End: 2023-04-26

## 2023-03-01 NOTE — TELEPHONE ENCOUNTER
Left message to call back for: Don  Information to relay to patient: Relay the message from Dr Bess below and set up BP recheck appointment per note.      I reviewed his blood pressures.  It appears that he needs an increase in his metoprolol dose.  I just sent in the 200 mg dose and asked that he come in in 3 to 4 weeks for blood pressure recheck.

## 2023-03-01 NOTE — PROGRESS NOTES
I met with Crispin Farr at the request of Dr Bess to recheck his blood pressure.  Blood pressure medications on the med list were reviewed with patient.    Patient has taken all medications as per usual regimen: Yes  Patient reports tolerating them without any issues or concerns: Yes    Vitals:    03/01/23 1407 03/01/23 1418   BP: (!) 146/77 (!) 145/80   BP Location: Left arm Left arm   Patient Position: Sitting Sitting   Cuff Size: Adult Regular Adult Regular   Pulse:  71       After 5 minutes, the patient's blood pressure remained greater than or equal to 140/90.    Is the patient currently having any chest pain? No  Does the patient currently have a headache? No  Does the patient currently have any vision changes? No  Does the patient currently have any nausea? No  Does the patient currently have any abdominal pain? No    The previous encounter was reviewed.  The patient was discharged and the note will be sent to the provider for final review.

## 2023-04-17 ENCOUNTER — MYC MEDICAL ADVICE (OUTPATIENT)
Dept: FAMILY MEDICINE | Facility: CLINIC | Age: 78
End: 2023-04-17
Payer: COMMERCIAL

## 2023-04-17 DIAGNOSIS — I10 ESSENTIAL HYPERTENSION: Primary | ICD-10-CM

## 2023-04-26 DIAGNOSIS — I10 ESSENTIAL HYPERTENSION: ICD-10-CM

## 2023-04-26 RX ORDER — METOPROLOL SUCCINATE 200 MG/1
200 TABLET, EXTENDED RELEASE ORAL DAILY
Qty: 30 TABLET | Refills: 1 | Status: CANCELLED | OUTPATIENT
Start: 2023-04-26

## 2023-04-26 RX ORDER — METOPROLOL SUCCINATE 200 MG/1
200 TABLET, EXTENDED RELEASE ORAL DAILY
Qty: 90 TABLET | Refills: 3 | Status: SHIPPED | OUTPATIENT
Start: 2023-04-26 | End: 2023-09-21

## 2023-04-26 RX ORDER — LOSARTAN POTASSIUM 50 MG/1
50 TABLET ORAL DAILY
Qty: 30 TABLET | Refills: 1 | Status: SHIPPED | OUTPATIENT
Start: 2023-04-26 | End: 2023-05-25

## 2023-04-26 NOTE — TELEPHONE ENCOUNTER
"Routing refill request to provider for review/approval because:  Blood pressure out of range    Last Written Prescription Date: 3/1/23  Last Fill Quantity: 30, # refills: 1  Last office visit provider: 1/9/23    Requested Prescriptions   Pending Prescriptions Disp Refills     metoprolol succinate ER (TOPROL XL) 200 MG 24 hr tablet 30 tablet 1     Sig: Take 1 tablet (200 mg) by mouth daily       Beta-Blockers Protocol Failed - 4/26/2023 12:45 PM        Failed - Blood pressure under 140/90 in past 12 months     BP Readings from Last 3 Encounters:   03/01/23 (!) 145/80   02/09/23 134/80   01/09/23 138/76                 Passed - Patient is age 6 or older        Passed - Recent (12 mo) or future (30 days) visit within the authorizing provider's specialty     Patient has had an office visit with the authorizing provider or a provider within the authorizing providers department within the previous 12 mos or has a future within next 30 days. See \"Patient Info\" tab in inbasket, or \"Choose Columns\" in Meds & Orders section of the refill encounter.              Passed - Medication is active on med list             Anum Hernadez RN 04/26/23 12:46 PM  "

## 2023-04-26 NOTE — TELEPHONE ENCOUNTER
Please let patient know that this would be in addition to the medications he is already on.  If he needs a new prescription, please prompt for me to fill.  Thank you

## 2023-04-28 ENCOUNTER — LAB (OUTPATIENT)
Dept: LAB | Facility: CLINIC | Age: 78
End: 2023-04-28
Payer: COMMERCIAL

## 2023-04-28 ENCOUNTER — TELEPHONE (OUTPATIENT)
Dept: FAMILY MEDICINE | Facility: CLINIC | Age: 78
End: 2023-04-28

## 2023-04-28 DIAGNOSIS — N18.31 CHRONIC KIDNEY DISEASE (CKD) STAGE G3A/A1, MODERATELY DECREASED GLOMERULAR FILTRATION RATE (GFR) BETWEEN 45-59 ML/MIN/1.73 SQUARE METER AND ALBUMINURIA CREATININE RATIO LESS THAN 30 MG/G (H): ICD-10-CM

## 2023-04-28 DIAGNOSIS — N18.31 CHRONIC KIDNEY DISEASE (CKD) STAGE G3A/A1, MODERATELY DECREASED GLOMERULAR FILTRATION RATE (GFR) BETWEEN 45-59 ML/MIN/1.73 SQUARE METER AND ALBUMINURIA CREATININE RATIO LESS THAN 30 MG/G (H): Primary | ICD-10-CM

## 2023-04-28 DIAGNOSIS — N18.31 STAGE 3A CHRONIC KIDNEY DISEASE (H): Primary | ICD-10-CM

## 2023-04-28 LAB
ERYTHROCYTE [DISTWIDTH] IN BLOOD BY AUTOMATED COUNT: 12.1 % (ref 10–15)
HCT VFR BLD AUTO: 38.4 % (ref 40–53)
HGB BLD-MCNC: 13 G/DL (ref 13.3–17.7)
MCH RBC QN AUTO: 34.9 PG (ref 26.5–33)
MCHC RBC AUTO-ENTMCNC: 33.9 G/DL (ref 31.5–36.5)
MCV RBC AUTO: 103 FL (ref 78–100)
PLATELET # BLD AUTO: 406 10E3/UL (ref 150–450)
RBC # BLD AUTO: 3.73 10E6/UL (ref 4.4–5.9)
WBC # BLD AUTO: 6 10E3/UL (ref 4–11)

## 2023-04-28 PROCEDURE — 82610 CYSTATIN C: CPT

## 2023-04-28 PROCEDURE — 83970 ASSAY OF PARATHORMONE: CPT

## 2023-04-28 PROCEDURE — 82570 ASSAY OF URINE CREATININE: CPT

## 2023-04-28 PROCEDURE — 80069 RENAL FUNCTION PANEL: CPT

## 2023-04-28 PROCEDURE — 36415 COLL VENOUS BLD VENIPUNCTURE: CPT

## 2023-04-28 PROCEDURE — 82043 UR ALBUMIN QUANTITATIVE: CPT

## 2023-04-28 PROCEDURE — 84156 ASSAY OF PROTEIN URINE: CPT

## 2023-04-28 PROCEDURE — 85027 COMPLETE CBC AUTOMATED: CPT

## 2023-04-28 PROCEDURE — 82306 VITAMIN D 25 HYDROXY: CPT

## 2023-04-28 NOTE — TELEPHONE ENCOUNTER
Crispin requested to talk to a Nurse when he came into clinic.    Situation:His concern was with his new medication of Losartan which he started on 4/25/23, in regards to his potassium levels and his kidneys. He noted that in the medication information he received when picking up his Losartan Rx, it stated that the medication increases potassium and affects kidneys.     Background: Crispin sees a kidney specialist who had taken Crispin off Lisinopril due to medication interaction with his kidneys.  Dx of stage 3a chronic kidney disease. His kidney functions have improved and Crispin wants that to continue.    Assessment: Kidney function labs 5 mo ago are WNL, today's labs are pending.  Potassium, drawn on 6/20/22 is WNL.  Blood pressures are high but stable.      Recommendation: I did tell Crispin to continue his Losartan at this time.  He has a f/u with his kidney specialist on Tuesday 5/2/23.  Crispin is requesting that Dr Bess be told of his concerns and advise on medication.      Appointment for nurse only visit for BP recheck was made for 3 weeks and this RN instructed Crispin to write down his Blood Pressures to review at that visit.

## 2023-04-29 LAB
ALBUMIN MFR UR ELPH: 17.4 MG/DL (ref 1–14)
ALBUMIN SERPL BCG-MCNC: 4.6 G/DL (ref 3.5–5.2)
ANION GAP SERPL CALCULATED.3IONS-SCNC: 16 MMOL/L (ref 7–15)
BUN SERPL-MCNC: 26.5 MG/DL (ref 8–23)
CALCIUM SERPL-MCNC: 9.9 MG/DL (ref 8.8–10.2)
CHLORIDE SERPL-SCNC: 101 MMOL/L (ref 98–107)
CREAT SERPL-MCNC: 1.44 MG/DL (ref 0.67–1.17)
CREAT UR-MCNC: 184 MG/DL
CREAT UR-MCNC: 185 MG/DL
CYSTATIN C (ROCHE): 1.9 MG/L (ref 0.6–1)
DEPRECATED CALCIDIOL+CALCIFEROL SERPL-MC: 36 UG/L (ref 20–75)
DEPRECATED HCO3 PLAS-SCNC: 22 MMOL/L (ref 22–29)
GFR SERPL CREATININE-BSD FRML MDRD: 31 ML/MIN/1.73M2
GFR SERPL CREATININE-BSD FRML MDRD: 50 ML/MIN/1.73M2
GLUCOSE SERPL-MCNC: 110 MG/DL (ref 70–99)
MICROALBUMIN UR-MCNC: <12 MG/L
MICROALBUMIN/CREAT UR: NORMAL MG/G{CREAT}
PHOSPHATE SERPL-MCNC: 3.7 MG/DL (ref 2.5–4.5)
POTASSIUM SERPL-SCNC: 4.7 MMOL/L (ref 3.4–5.3)
PROT/CREAT 24H UR: 0.09 MG/MG CR (ref 0–0.2)
PTH-INTACT SERPL-MCNC: 40 PG/ML (ref 15–65)
SODIUM SERPL-SCNC: 139 MMOL/L (ref 136–145)

## 2023-04-29 NOTE — TELEPHONE ENCOUNTER
"Please let patient know that I appreciate his concern regarding the Losartan. This medication is similar to Lisinpril but not in the same class. It can affect the kidneys as well as electrolytes. However, it also \"protects\" the kidneys so if it does not result in a significant reduction in GFR, we often will continue it. His most recent labs showed that his kidney function is relatively stable and potassium is just fine. I would continue the medicaiton and see what his nephrologist wants to do next week.   "

## 2023-05-02 DIAGNOSIS — N18.31 CHRONIC KIDNEY DISEASE (CKD) STAGE G3A/A1, MODERATELY DECREASED GLOMERULAR FILTRATION RATE (GFR) BETWEEN 45-59 ML/MIN/1.73 SQUARE METER AND ALBUMINURIA CREATININE RATIO LESS THAN 30 MG/G (H): Primary | ICD-10-CM

## 2023-05-12 DIAGNOSIS — R05.3 CHRONIC COUGH: Primary | ICD-10-CM

## 2023-05-12 DIAGNOSIS — J47.9 BRONCHIECTASIS WITHOUT COMPLICATION (H): ICD-10-CM

## 2023-05-12 RX ORDER — IPRATROPIUM BROMIDE AND ALBUTEROL SULFATE 2.5; .5 MG/3ML; MG/3ML
1 SOLUTION RESPIRATORY (INHALATION) EVERY 12 HOURS
Qty: 90 ML | Refills: 3 | Status: SHIPPED | OUTPATIENT
Start: 2023-05-12 | End: 2023-09-21

## 2023-05-12 NOTE — CONFIDENTIAL NOTE
I have prescribed an Aerobika device, nebulizer machine and DuoNebs to use every 12 hours to help with expectoration of secretions.    Cheryl Villareal MD  Pulmonary and Critical Care  (P) [phone]

## 2023-05-18 ENCOUNTER — LAB (OUTPATIENT)
Dept: LAB | Facility: CLINIC | Age: 78
End: 2023-05-18
Payer: COMMERCIAL

## 2023-05-18 ENCOUNTER — MEDICAL CORRESPONDENCE (OUTPATIENT)
Dept: HEALTH INFORMATION MANAGEMENT | Facility: CLINIC | Age: 78
End: 2023-05-18

## 2023-05-18 ENCOUNTER — ALLIED HEALTH/NURSE VISIT (OUTPATIENT)
Dept: FAMILY MEDICINE | Facility: CLINIC | Age: 78
End: 2023-05-18
Payer: COMMERCIAL

## 2023-05-18 VITALS — HEART RATE: 55 BPM | OXYGEN SATURATION: 96 % | DIASTOLIC BLOOD PRESSURE: 78 MMHG | SYSTOLIC BLOOD PRESSURE: 126 MMHG

## 2023-05-18 DIAGNOSIS — I10 ESSENTIAL HYPERTENSION: Primary | ICD-10-CM

## 2023-05-18 DIAGNOSIS — N18.31 CHRONIC KIDNEY DISEASE (CKD) STAGE G3A/A1, MODERATELY DECREASED GLOMERULAR FILTRATION RATE (GFR) BETWEEN 45-59 ML/MIN/1.73 SQUARE METER AND ALBUMINURIA CREATININE RATIO LESS THAN 30 MG/G (H): ICD-10-CM

## 2023-05-18 LAB
ANION GAP SERPL CALCULATED.3IONS-SCNC: 13 MMOL/L (ref 7–15)
BUN SERPL-MCNC: 17.6 MG/DL (ref 8–23)
CALCIUM SERPL-MCNC: 9.7 MG/DL (ref 8.8–10.2)
CHLORIDE SERPL-SCNC: 105 MMOL/L (ref 98–107)
CREAT SERPL-MCNC: 1.27 MG/DL (ref 0.67–1.17)
DEPRECATED HCO3 PLAS-SCNC: 23 MMOL/L (ref 22–29)
GFR SERPL CREATININE-BSD FRML MDRD: 58 ML/MIN/1.73M2
GLUCOSE SERPL-MCNC: 95 MG/DL (ref 70–99)
POTASSIUM SERPL-SCNC: 4.5 MMOL/L (ref 3.4–5.3)
SODIUM SERPL-SCNC: 141 MMOL/L (ref 136–145)

## 2023-05-18 PROCEDURE — 36415 COLL VENOUS BLD VENIPUNCTURE: CPT

## 2023-05-18 PROCEDURE — 80048 BASIC METABOLIC PNL TOTAL CA: CPT

## 2023-05-18 PROCEDURE — 99207 PR NO CHARGE NURSE ONLY: CPT

## 2023-05-18 RX ORDER — LOSARTAN POTASSIUM 50 MG/1
50 TABLET ORAL DAILY
Qty: 30 TABLET | Refills: 1 | Status: CANCELLED | OUTPATIENT
Start: 2023-05-18

## 2023-05-18 RX ORDER — AMLODIPINE BESYLATE 10 MG/1
10 TABLET ORAL DAILY
Qty: 90 TABLET | Refills: 3 | Status: CANCELLED | OUTPATIENT
Start: 2023-05-18

## 2023-05-18 NOTE — PROGRESS NOTES
I met with Crispin Farr at the request of Dr. Bess to recheck his blood pressure.  Blood pressure medications on the med list were reviewed with patient.    Patient has taken all medications as per usual regimen: Yes- patient will need refill of losartan.   Patient reports tolerating them without any issues or concerns: Yes    Vitals:    05/18/23 1109   BP: 126/78   BP Location: Left arm   Patient Position: Sitting   Cuff Size: Adult Regular   Pulse: 55   SpO2: 96%       Blood pressure was taken, previous encounter was reviewed, recorded blood pressure below 140/90.  Patient was discharged and the note will be sent to the provider for final review.

## 2023-05-25 DIAGNOSIS — I10 ESSENTIAL HYPERTENSION: ICD-10-CM

## 2023-05-25 RX ORDER — LOSARTAN POTASSIUM 50 MG/1
50 TABLET ORAL DAILY
Qty: 90 TABLET | Refills: 3 | Status: SHIPPED | OUTPATIENT
Start: 2023-05-25 | End: 2024-02-27

## 2023-05-25 NOTE — TELEPHONE ENCOUNTER
"Routing refill request to provider for review/approval because:  Labs out of range:  Cr    Last Written Prescription Date: 4/26/23  Last Fill Quantity: 30, # refills: 1  Last office visit provider: 1/9/23    Requested Prescriptions   Pending Prescriptions Disp Refills     losartan (COZAAR) 50 MG tablet 30 tablet 1     Sig: Take 1 tablet (50 mg) by mouth daily       Angiotensin-II Receptors Failed - 5/25/2023 11:31 AM        Failed - Normal serum creatinine on file in past 12 months     Recent Labs   Lab Test 05/18/23  1121   CR 1.27*       Ok to refill medication if creatinine is low          Passed - Last blood pressure under 140/90 in past 12 months     BP Readings from Last 3 Encounters:   05/18/23 126/78   03/01/23 (!) 145/80   02/09/23 134/80                 Passed - Recent (12 mo) or future (30 days) visit within the authorizing provider's specialty     Patient has had an office visit with the authorizing provider or a provider within the authorizing providers department within the previous 12 mos or has a future within next 30 days. See \"Patient Info\" tab in inbasket, or \"Choose Columns\" in Meds & Orders section of the refill encounter.              Passed - Medication is active on med list        Passed - Patient is age 18 or older        Passed - Normal serum potassium on file in past 12 months     Recent Labs   Lab Test 05/18/23  1121   POTASSIUM 4.5                         Oscar Riley RN 05/25/23 11:31 AM    "

## 2023-05-25 NOTE — TELEPHONE ENCOUNTER
Patient requested refill of below pended medication at time of BP check on 5/18. Med pended and will route to refill pool

## 2023-09-21 ENCOUNTER — OFFICE VISIT (OUTPATIENT)
Dept: FAMILY MEDICINE | Facility: CLINIC | Age: 78
End: 2023-09-21
Payer: COMMERCIAL

## 2023-09-21 VITALS
BODY MASS INDEX: 25.76 KG/M2 | HEIGHT: 68 IN | HEART RATE: 72 BPM | DIASTOLIC BLOOD PRESSURE: 69 MMHG | WEIGHT: 170 LBS | OXYGEN SATURATION: 97 % | SYSTOLIC BLOOD PRESSURE: 113 MMHG

## 2023-09-21 DIAGNOSIS — I10 ESSENTIAL HYPERTENSION: Primary | ICD-10-CM

## 2023-09-21 DIAGNOSIS — L72.3 SEBACEOUS CYST: ICD-10-CM

## 2023-09-21 LAB
ANION GAP SERPL CALCULATED.3IONS-SCNC: 13 MMOL/L (ref 7–15)
BASOPHILS # BLD AUTO: 0.1 10E3/UL (ref 0–0.2)
BASOPHILS NFR BLD AUTO: 1 %
BUN SERPL-MCNC: 24.5 MG/DL (ref 8–23)
CALCIUM SERPL-MCNC: 9.5 MG/DL (ref 8.8–10.2)
CHLORIDE SERPL-SCNC: 106 MMOL/L (ref 98–107)
CREAT SERPL-MCNC: 1.3 MG/DL (ref 0.67–1.17)
DEPRECATED HCO3 PLAS-SCNC: 21 MMOL/L (ref 22–29)
EGFRCR SERPLBLD CKD-EPI 2021: 57 ML/MIN/1.73M2
EOSINOPHIL # BLD AUTO: 0.2 10E3/UL (ref 0–0.7)
EOSINOPHIL NFR BLD AUTO: 3 %
ERYTHROCYTE [DISTWIDTH] IN BLOOD BY AUTOMATED COUNT: 13 % (ref 10–15)
GLUCOSE SERPL-MCNC: 89 MG/DL (ref 70–99)
HCT VFR BLD AUTO: 35.5 % (ref 40–53)
HGB BLD-MCNC: 12.1 G/DL (ref 13.3–17.7)
IMM GRANULOCYTES # BLD: 0 10E3/UL
IMM GRANULOCYTES NFR BLD: 0 %
LYMPHOCYTES # BLD AUTO: 2.3 10E3/UL (ref 0.8–5.3)
LYMPHOCYTES NFR BLD AUTO: 40 %
MCH RBC QN AUTO: 32.3 PG (ref 26.5–33)
MCHC RBC AUTO-ENTMCNC: 34.1 G/DL (ref 31.5–36.5)
MCV RBC AUTO: 95 FL (ref 78–100)
MONOCYTES # BLD AUTO: 0.4 10E3/UL (ref 0–1.3)
MONOCYTES NFR BLD AUTO: 7 %
NEUTROPHILS # BLD AUTO: 2.8 10E3/UL (ref 1.6–8.3)
NEUTROPHILS NFR BLD AUTO: 49 %
PLATELET # BLD AUTO: 385 10E3/UL (ref 150–450)
POTASSIUM SERPL-SCNC: 4.5 MMOL/L (ref 3.4–5.3)
RBC # BLD AUTO: 3.75 10E6/UL (ref 4.4–5.9)
SODIUM SERPL-SCNC: 140 MMOL/L (ref 136–145)
WBC # BLD AUTO: 5.8 10E3/UL (ref 4–11)

## 2023-09-21 PROCEDURE — G0008 ADMIN INFLUENZA VIRUS VAC: HCPCS | Performed by: FAMILY MEDICINE

## 2023-09-21 PROCEDURE — 90662 IIV NO PRSV INCREASED AG IM: CPT | Performed by: FAMILY MEDICINE

## 2023-09-21 PROCEDURE — 36415 COLL VENOUS BLD VENIPUNCTURE: CPT | Performed by: FAMILY MEDICINE

## 2023-09-21 PROCEDURE — 99213 OFFICE O/P EST LOW 20 MIN: CPT | Mod: 25 | Performed by: FAMILY MEDICINE

## 2023-09-21 PROCEDURE — 85025 COMPLETE CBC W/AUTO DIFF WBC: CPT | Performed by: FAMILY MEDICINE

## 2023-09-21 PROCEDURE — 80048 BASIC METABOLIC PNL TOTAL CA: CPT | Performed by: FAMILY MEDICINE

## 2023-09-21 NOTE — PROGRESS NOTES
"  Assessment & Plan   Problem List Items Addressed This Visit          Circulatory    Essential hypertension - Primary     The patient's blood pressure does appear to be under ideal control.  He did bring his machine in and it appears to be well calibrated.  He will continue to monitor his blood pressures at home and I provided him with parameters for ideal blood pressure control as well as when to call.  Doing well on amlodipine 10 mg and losartan 50 mg daily.         Relevant Orders    Basic metabolic panel  (Ca, Cl, CO2, Creat, Gluc, K, Na, BUN)     Other Visit Diagnoses       Sebaceous cyst        Patient will monitor for now and consider making an appointment for excision.           956}     BMI:   Estimated body mass index is 25.85 kg/m  as calculated from the following:    Height as of this encounter: 1.727 m (5' 8\").    Weight as of this encounter: 77.1 kg (170 lb).         DIEGO CHOWDHURY MD  [facility] ISAAC Gill is a 77 year old resents today regarding high blood pressure as well as a lump on his back.  He believes the lump has been there for about a month.  His wife noticed it when they were going swimming.  He does think it has increased a bit in size.  Denies that it is painful although it was itchy when he first noticed it.  He has been taking his losartan 50 mg along with amlodipine 10 mg and is tolerating the medication very well.  He has been checking his blood pressures 2-3 times a day and states that they are quite excellent.  He rarely gets a blood pressure under 110 systolic and most the time his numbers run around 110 to 120s.  Hypertension and skin mass on back      History of Present Illness       Reason for visit:  Skin or sub-cutaneous mass in back region, discuss hypertension medications  Symptom onset:  More than a month  Symptoms include:  Began with itchy skin on my back, then about 3 weeks ago a growing mass appeared  Symptom intensity:  " Mild  Symptom progression:  Improving  Had these symptoms before:  No  What makes it worse:  No  What makes it better:  No    He eats 2-3 servings of fruits and vegetables daily.He consumes 0 sweetened beverage(s) daily.He exercises with enough effort to increase his heart rate 30 to 60 minutes per day.  He exercises with enough effort to increase his heart rate 5 days per week.   He is taking medications regularly.           Objective    /70 (BP Location: Left arm, Patient Position: Left side, Cuff Size: Adult Large)   Pulse 72   Wt 77.1 kg (170 lb)   SpO2 97%   BMI 25.85 kg/m    Body mass index is 25.85 kg/m .  Physical Exam   GENERAL: healthy, alert and no distress  BACK: Has a subcutaneous soft cystic mass measuring about 1 cm in size on his mid upper back.

## 2023-09-21 NOTE — ASSESSMENT & PLAN NOTE
The patient's blood pressure does appear to be under ideal control.  He did bring his machine in and it appears to be well calibrated.  He will continue to monitor his blood pressures at home and I provided him with parameters for ideal blood pressure control as well as when to call.  Doing well on amlodipine 10 mg and losartan 50 mg daily.

## 2023-10-25 DIAGNOSIS — N18.31 CHRONIC KIDNEY DISEASE (CKD) STAGE G3A/A1, MODERATELY DECREASED GLOMERULAR FILTRATION RATE (GFR) BETWEEN 45-59 ML/MIN/1.73 SQUARE METER AND ALBUMINURIA CREATININE RATIO LESS THAN 30 MG/G (H): Primary | ICD-10-CM

## 2023-10-27 ENCOUNTER — NURSE TRIAGE (OUTPATIENT)
Dept: NURSING | Facility: CLINIC | Age: 78
End: 2023-10-27
Payer: COMMERCIAL

## 2023-10-27 ENCOUNTER — TELEPHONE (OUTPATIENT)
Dept: FAMILY MEDICINE | Facility: CLINIC | Age: 78
End: 2023-10-27
Payer: COMMERCIAL

## 2023-10-27 NOTE — TELEPHONE ENCOUNTER
I had a deer tick on me about three days ago.  Partially engorged.  Was able to remove it completely.  Noticed today a large area of inflammation. 3 inches.  Asking if he can be seen today.  I advised that he is seen today.   He'll try to make an appointment.  Will go to [facility] is no opening.    KELSEA Had lyme disease many years ago.    Reason for Disposition   Red streak or red line and length > 2 inches (5 cm)    Additional Information   Negative: Not a tick bite   Negative: Patient sounds very sick or weak to the triager   Negative: Fever or severe headache occurs, 2 to 14 days following the bite   Negative: Widespread rash occurs, 2 to 14 days following the bite   Negative: Can't remove live tick (after using Care Advice)   Negative: Fever and spreading red area or streak   Negative: Fever and area is very tender to touch    Protocols used: Tick Bite-A-OH  Pushpa MCKEON RN [facility]

## 2023-10-27 NOTE — TELEPHONE ENCOUNTER
Call to patient to inform no clinic availability today. Patient states he will go to [facility] walk in care right now.

## 2023-10-27 NOTE — TELEPHONE ENCOUNTER
Reason for Call:  Appointment Request    Patient requesting this type of appt:  Office visit    Requested provider:  any provider    Reason patient unable to be scheduled: Not within requested timeframe    When does patient want to be seen/preferred time: 3-7 days    Comments: patient called and is requesting to get worked in for a deer tick bite     Could we send this information to you in [facility] or would you prefer to receive a phone call?:   Patient would prefer a phone call   Okay to leave a detailed message?: Yes at Home number on file [phone] (home)    Call taken on 10/27/2023 at 10:56 AM by Ghada Neri

## 2023-12-13 DIAGNOSIS — N18.31 CHRONIC KIDNEY DISEASE (CKD) STAGE G3A/A1, MODERATELY DECREASED GLOMERULAR FILTRATION RATE (GFR) BETWEEN 45-59 ML/MIN/1.73 SQUARE METER AND ALBUMINURIA CREATININE RATIO LESS THAN 30 MG/G (H): Primary | ICD-10-CM

## 2023-12-18 ENCOUNTER — LAB (OUTPATIENT)
Dept: LAB | Facility: CLINIC | Age: 78
End: 2023-12-18
Payer: COMMERCIAL

## 2023-12-18 DIAGNOSIS — N18.31 CHRONIC KIDNEY DISEASE (CKD) STAGE G3A/A1, MODERATELY DECREASED GLOMERULAR FILTRATION RATE (GFR) BETWEEN 45-59 ML/MIN/1.73 SQUARE METER AND ALBUMINURIA CREATININE RATIO LESS THAN 30 MG/G (H): ICD-10-CM

## 2023-12-18 LAB
ERYTHROCYTE [DISTWIDTH] IN BLOOD BY AUTOMATED COUNT: 13.9 % (ref 10–15)
HCT VFR BLD AUTO: 36.3 % (ref 40–53)
HGB BLD-MCNC: 12.4 G/DL (ref 13.3–17.7)
MCH RBC QN AUTO: 32.5 PG (ref 26.5–33)
MCHC RBC AUTO-ENTMCNC: 34.2 G/DL (ref 31.5–36.5)
MCV RBC AUTO: 95 FL (ref 78–100)
PLATELET # BLD AUTO: 377 10E3/UL (ref 150–450)
RBC # BLD AUTO: 3.81 10E6/UL (ref 4.4–5.9)
WBC # BLD AUTO: 6.6 10E3/UL (ref 4–11)

## 2023-12-18 PROCEDURE — 85027 COMPLETE CBC AUTOMATED: CPT

## 2023-12-18 PROCEDURE — 36415 COLL VENOUS BLD VENIPUNCTURE: CPT

## 2023-12-18 PROCEDURE — 82306 VITAMIN D 25 HYDROXY: CPT

## 2023-12-18 PROCEDURE — 83970 ASSAY OF PARATHORMONE: CPT

## 2023-12-18 PROCEDURE — 80069 RENAL FUNCTION PANEL: CPT

## 2023-12-19 LAB
ALBUMIN SERPL BCG-MCNC: 4.7 G/DL (ref 3.5–5.2)
ANION GAP SERPL CALCULATED.3IONS-SCNC: 11 MMOL/L (ref 7–15)
BUN SERPL-MCNC: 20.1 MG/DL (ref 8–23)
CALCIUM SERPL-MCNC: 9.8 MG/DL (ref 8.8–10.2)
CHLORIDE SERPL-SCNC: 103 MMOL/L (ref 98–107)
CREAT SERPL-MCNC: 1.2 MG/DL (ref 0.67–1.17)
DEPRECATED HCO3 PLAS-SCNC: 24 MMOL/L (ref 22–29)
EGFRCR SERPLBLD CKD-EPI 2021: 62 ML/MIN/1.73M2
GLUCOSE SERPL-MCNC: 119 MG/DL (ref 70–99)
PHOSPHATE SERPL-MCNC: 3.2 MG/DL (ref 2.5–4.5)
POTASSIUM SERPL-SCNC: 4.5 MMOL/L (ref 3.4–5.3)
PTH-INTACT SERPL-MCNC: 42 PG/ML (ref 15–65)
SODIUM SERPL-SCNC: 138 MMOL/L (ref 135–145)
VIT D+METAB SERPL-MCNC: 46 NG/ML (ref 20–50)

## 2024-01-11 ENCOUNTER — TRANSFERRED RECORDS (OUTPATIENT)
Dept: HEALTH INFORMATION MANAGEMENT | Facility: CLINIC | Age: 79
End: 2024-01-11
Payer: COMMERCIAL

## 2024-02-18 SDOH — HEALTH STABILITY: PHYSICAL HEALTH: ON AVERAGE, HOW MANY DAYS PER WEEK DO YOU ENGAGE IN MODERATE TO STRENUOUS EXERCISE (LIKE A BRISK WALK)?: 3 DAYS

## 2024-02-18 SDOH — HEALTH STABILITY: PHYSICAL HEALTH: ON AVERAGE, HOW MANY MINUTES DO YOU ENGAGE IN EXERCISE AT THIS LEVEL?: 60 MIN

## 2024-02-18 ASSESSMENT — SOCIAL DETERMINANTS OF HEALTH (SDOH): HOW OFTEN DO YOU GET TOGETHER WITH FRIENDS OR RELATIVES?: TWICE A WEEK

## 2024-02-19 ENCOUNTER — OFFICE VISIT (OUTPATIENT)
Dept: FAMILY MEDICINE | Facility: CLINIC | Age: 79
End: 2024-02-19
Payer: COMMERCIAL

## 2024-02-19 VITALS
RESPIRATION RATE: 18 BRPM | DIASTOLIC BLOOD PRESSURE: 78 MMHG | TEMPERATURE: 97.6 F | BODY MASS INDEX: 26.67 KG/M2 | HEIGHT: 68 IN | OXYGEN SATURATION: 97 % | HEART RATE: 69 BPM | WEIGHT: 176 LBS | SYSTOLIC BLOOD PRESSURE: 132 MMHG

## 2024-02-19 DIAGNOSIS — I10 ESSENTIAL HYPERTENSION: ICD-10-CM

## 2024-02-19 DIAGNOSIS — Z00.00 ENCOUNTER FOR MEDICARE ANNUAL WELLNESS EXAM: Primary | ICD-10-CM

## 2024-02-19 DIAGNOSIS — R20.0 NUMBNESS AND TINGLING OF FOOT: ICD-10-CM

## 2024-02-19 DIAGNOSIS — N52.9 ERECTILE DYSFUNCTION, UNSPECIFIED ERECTILE DYSFUNCTION TYPE: ICD-10-CM

## 2024-02-19 DIAGNOSIS — N18.31 STAGE 3A CHRONIC KIDNEY DISEASE (H): ICD-10-CM

## 2024-02-19 DIAGNOSIS — R20.2 NUMBNESS AND TINGLING OF FOOT: ICD-10-CM

## 2024-02-19 DIAGNOSIS — Z12.11 SCREEN FOR COLON CANCER: ICD-10-CM

## 2024-02-19 DIAGNOSIS — H61.21 IMPACTED CERUMEN OF RIGHT EAR: ICD-10-CM

## 2024-02-19 DIAGNOSIS — D47.2 MGUS (MONOCLONAL GAMMOPATHY OF UNKNOWN SIGNIFICANCE): ICD-10-CM

## 2024-02-19 DIAGNOSIS — Z80.0 FAMILY HISTORY OF COLON CANCER: ICD-10-CM

## 2024-02-19 DIAGNOSIS — M51.369 DEGENERATION OF INTERVERTEBRAL DISC OF LUMBAR REGION: ICD-10-CM

## 2024-02-19 PROBLEM — G62.9 PERIPHERAL NEUROPATHY: Status: RESOLVED | Noted: 2024-02-19 | Resolved: 2024-02-19

## 2024-02-19 PROBLEM — G62.9 PERIPHERAL NEUROPATHY: Status: ACTIVE | Noted: 2024-02-19

## 2024-02-19 PROBLEM — R05.3 CHRONIC COUGH: Status: RESOLVED | Noted: 2023-01-10 | Resolved: 2024-02-19

## 2024-02-19 PROCEDURE — 36415 COLL VENOUS BLD VENIPUNCTURE: CPT | Performed by: FAMILY MEDICINE

## 2024-02-19 PROCEDURE — 99214 OFFICE O/P EST MOD 30 MIN: CPT | Mod: 25 | Performed by: FAMILY MEDICINE

## 2024-02-19 PROCEDURE — 82607 VITAMIN B-12: CPT | Performed by: FAMILY MEDICINE

## 2024-02-19 PROCEDURE — 80061 LIPID PANEL: CPT | Performed by: FAMILY MEDICINE

## 2024-02-19 PROCEDURE — 69209 REMOVE IMPACTED EAR WAX UNI: CPT | Mod: RT | Performed by: FAMILY MEDICINE

## 2024-02-19 PROCEDURE — G0439 PPPS, SUBSEQ VISIT: HCPCS | Performed by: FAMILY MEDICINE

## 2024-02-19 PROCEDURE — 84443 ASSAY THYROID STIM HORMONE: CPT | Performed by: FAMILY MEDICINE

## 2024-02-19 RX ORDER — RESPIRATORY SYNCYTIAL VIRUS VACCINE 120MCG/0.5
0.5 KIT INTRAMUSCULAR ONCE
Qty: 1 EACH | Refills: 0 | Status: CANCELLED | OUTPATIENT
Start: 2024-02-19 | End: 2024-02-19

## 2024-02-19 NOTE — PATIENT INSTRUCTIONS
Preventive Care Advice   This is general advice given by our system to help you stay healthy. However, your care team may have specific advice just for you. Please talk to your care team about your preventive care needs.  Nutrition  Eat 5 or more servings of fruits and vegetables each day.  Try wheat bread, brown rice and whole grain pasta (instead of white bread, rice, and pasta).  Get enough calcium and vitamin D. Check the label on foods and aim for 100% of the RDA (recommended daily allowance).  Lifestyle  Exercise at least 150 minutes each week  (30 minutes a day, 5 days a week).  Do muscle strengthening activities 2 days a week. These help control your weight and prevent disease.  No smoking.  Wear sunscreen to prevent skin cancer.  Have a dental exam and cleaning every 6 months.  Yearly exams  See your health care team every year to talk about:  Any changes in your health.  Any medicines your care team has prescribed.  Preventive care, family planning, and ways to prevent chronic diseases.  Shots (vaccines)   HPV shots (up to age 26), if you've never had them before.  Hepatitis B shots (up to age 59), if you've never had them before.  COVID-19 shot: Get this shot when it's due.  Flu shot: Get a flu shot every year.  Tetanus shot: Get a tetanus shot every 10 years.  Pneumococcal, hepatitis A, and RSV shots: Ask your care team if you need these based on your risk.  Shingles shot (for age 50 and up)  General health tests  Diabetes screening:  Starting at age 35, Get screened for diabetes at least every 3 years.  If you are younger than age 35, ask your care team if you should be screened for diabetes.  Cholesterol test: At age 39, start having a cholesterol test every 5 years, or more often if advised.  Bone density scan (DEXA): At age 50, ask your care team if you should have this scan for osteoporosis (brittle bones).  Hepatitis C: Get tested at least once in your life.  STIs (sexually transmitted  infections)  Before age 24: Ask your care team if you should be screened for STIs.  After age 24: Get screened for STIs if you're at risk. You are at risk for STIs (including HIV) if:  You are sexually active with more than one person.  You don't use condoms every time.  You or a partner was diagnosed with a sexually transmitted infection.  If you are at risk for HIV, ask about PrEP medicine to prevent HIV.  Get tested for HIV at least once in your life, whether you are at risk for HIV or not.  Cancer screening tests  Cervical cancer screening: If you have a cervix, begin getting regular cervical cancer screening tests starting at age 21.  Breast cancer scan (mammogram): If you've ever had breasts, begin having regular mammograms starting at age 40. This is a scan to check for breast cancer.  Colon cancer screening: It is important to start screening for colon cancer at age 45.  Have a colonoscopy test every 10 years (or more often if you're at risk) Or, ask your provider about stool tests like a FIT test every year or Cologuard test every 3 years.  To learn more about your testing options, visit:   https://[email or web address]/235316.pdf.  For help making a decision, visit:   https://bit.ly/mi50320.  Prostate cancer screening test: If you have a prostate, ask your care team if a prostate cancer screening test (PSA) at age 55 is right for you.  Lung cancer screening: If you are a current or former smoker ages 50 to 80, ask your care team if ongoing lung cancer screenings are right for you.  For informational purposes only. Not to replace the advice of your health care provider. Copyright   2023 [employer]. All rights reserved. Clinically reviewed by the [facility]. [employer] 659432 - REV 01/24.

## 2024-02-19 NOTE — ASSESSMENT & PLAN NOTE
The patient's blood pressure is generally under excellent control and he monitors closely at home with typical readings in the 1 teens systolic.  Continue amlodipine 10 mg and losartan 50 mg daily.  He sees his nephrologist regularly and had lab work recently in December which I reviewed.

## 2024-02-19 NOTE — PROGRESS NOTES
"Preventive Care Visit  [facility]  DIEGO CHOWDHURY MD, Family Medicine  Feb 19, 2024    Assessment & Plan   Problem List Items Addressed This Visit       Degeneration of intervertebral disc of lumbar region    Essential hypertension     The patient's blood pressure is generally under excellent control and he monitors closely at home with typical readings in the 1 teens systolic.  Continue amlodipine 10 mg and losartan 50 mg daily.  He sees his nephrologist regularly and had lab work recently in December which I reviewed.         Relevant Orders    Lipid panel reflex to direct LDL Non-fasting    Stage 3a chronic kidney disease (H)     Stable and follows with nephrology.         Family history of colon cancer     Due for updated colon cancer screening and an order was placed today.         Erectile dysfunction, unspecified erectile dysfunction type     Uses sildenafil successfully.         MGUS (monoclonal gammopathy of unknown significance)     Sees [place] [facility] annually for surveillance of his MGUS.          Other Visit Diagnoses       Encounter for Medicare annual wellness exam    -  Primary    Screen for colon cancer        Relevant Orders    Colonoscopy Screening  Referral    Numbness and tingling of foot        Relevant Orders    EMG    TSH with free T4 reflex    Vitamin B12    Impacted cerumen of right ear        Successfuly removed cerumen impaction today with irrigation.    Relevant Orders    REMOVE IMPACTED CERUMEN (Completed)                BMI  Estimated body mass index is 26.76 kg/m  as calculated from the following:    Height as of this encounter: 1.727 m (5' 8\").    Weight as of this encounter: 79.8 kg (176 lb).       Counseling  Appropriate preventive services were discussed with this patient, including applicable screening as appropriate for fall prevention, nutrition, physical activity, Tobacco-use cessation, weight loss and cognition.  " Checklist reviewing preventive services available has been given to the patient.  Reviewed patient's diet, addressing concerns and/or questions.   He is at risk for lack of exercise and has been provided with information to increase physical activity for the benefit of his well-being.         Clara Gill is a 78 year old who presents today for an annual well-child check and medication visit today.  Overall he is feeling well although has a couple questions today.  The patient wonders if he has peripheral neuropathy.  He states that a couple years ago he started noticing numbness of his toes.  However, he reports that he spent a few days standing a lot and on hard floor and started developing pain in particular of his heels and arches.  The pain was quite severe and seem to make the numbness worse and had some tingling.  He states that the pain is currently under reasonably good control as long as he does not spend a prolonged amount of time on his feet.  He does not notice mild numbness of his toes bilaterally.  Wellness Visit (/)        2/19/2024    11:05 AM   Additional Questions   Roomed by as   Accompanied by self         2/19/2024    11:05 AM   Patient Reported Additional Medications   Patient reports taking the following new medications no       Health Care Directive  Patient does not have a Health Care Directive or Living Will: Patient states has Advance Directive and will bring in a copy to clinic.        2/18/2024   General Health   How would you rate your overall physical health? Good   Feel stress (tense, anxious, or unable to sleep) Not at all         2/18/2024   Nutrition   Diet: Regular (no restrictions)         2/18/2024   Exercise   Days per week of moderate/strenous exercise 3 days   Average minutes spent exercising at this level 60 min         2/18/2024   Social Factors   Frequency of gathering with friends or relatives Twice a week   Worry food won't last until get money to buy more No   Food  not last or not have enough money for food? No   Do you have housing?  Yes   Are you worried about losing your housing? No   Lack of transportation? No   Unable to get utilities (heat,electricity)? No         2/18/2024   Fall Risk   Fallen 2 or more times in the past year? No    No   Trouble with walking or balance? No    No          2/18/2024   Activities of Daily Living- Home Safety   Needs help with the following daily activites None of the above   Safety concerns in the home None of the above         2/18/2024   Dental   Dentist two times every year? Yes         2/18/2024   Hearing Screening   Hearing concerns? None of the above         2/18/2024   Driving Risk Screening   Patient/family members have concerns about driving No         2/18/2024   General Alertness/Fatigue Screening   Have you been more tired than usual lately? No         2/18/2024   Urinary Incontinence Screening   Bothered by leaking urine in past 6 months No         2/18/2024   TB Screening   Were you born outside of [place]?  No         Today's PHQ-2 Score:       2/18/2024    12:46 PM   PHQ-2 ( 1999 [employer])   Q1: Little interest or pleasure in doing things 0   Q2: Feeling down, depressed or hopeless 0   PHQ-2 Score 0   Q1: Little interest or pleasure in doing things Not at all   Q2: Feeling down, depressed or hopeless Not at all   PHQ-2 Score 0           2/18/2024   Substance Use   Alcohol more than 3/day or more than 7/wk Not Applicable   Do you have a current opioid prescription? No   How severe/bad is pain from 1 to 10? 4/10   Do you use any other substances recreationally? No     Social History     Tobacco Use    Smoking status: Former    Smokeless tobacco: Never   Vaping Use    Vaping Use: Never used   Substance Use Topics    Alcohol use: Not Currently     Alcohol/week: 3.0 standard drinks of alcohol    Drug use: Never       The 10-year ASCVD risk score (Kika SUMMERS, et al., 2019) is: 33.1%    Values used to calculate the score:      Age: 78  years      Sex: Male      Is Non- : No      Diabetic: No      Tobacco smoker: No      Systolic Blood Pressure: 132 mmHg      Is BP treated: Yes      HDL Cholesterol: 80 mg/dL      Total Cholesterol: 255 mg/dL            Reviewed and updated as needed this visit by Provider      Problems               Patient Active Problem List   Diagnosis    Degeneration of intervertebral disc of lumbar region    Essential hypertension    Stage 3a chronic kidney disease (H)    Family history of colon cancer    Erectile dysfunction, unspecified erectile dysfunction type    MGUS (monoclonal gammopathy of unknown significance)     Past Surgical History:   Procedure Laterality Date    ARTHROPLASTY SHOULDER Left 8/23/2016    Procedure: ARTHROPLASTY SHOULDER;  Surgeon: Varinder Perez MD;  Location: [facility] OR    OPEN REDUCTION INTERNAL FIXATION ANKLE Right 1973       Social History     Tobacco Use    Smoking status: Former    Smokeless tobacco: Never   Substance Use Topics    Alcohol use: Not Currently     Alcohol/week: 3.0 standard drinks of alcohol     Family History   Problem Relation Age of Onset    Coronary Artery Disease Father     Colon Cancer Cousin     Depression Brother          Current Outpatient Medications   Medication Sig Dispense Refill    amLODIPine (NORVASC) 10 MG tablet Take 1 tablet (10 mg) by mouth daily 90 tablet 3    cyanocobalamin (VITAMIN B-12) 100 MCG tablet Take 100 mcg by mouth daily      folic acid (FOLVITE) 1 MG tablet Take 1 tablet by mouth      gabapentin (NEURONTIN) 300 MG capsule Take 2 capsules (600 mg) by mouth 3 times daily 360 capsule 3    losartan (COZAAR) 50 MG tablet Take 1 tablet (50 mg) by mouth daily 90 tablet 3    Omega-3 Fatty Acids (OMEGA-3 FISH OIL PO) Take 1,200 mg by mouth daily       pyridOXINE (VITAMIN B6) 100 MG TABS       sildenafil (REVATIO) 20 MG tablet Take 3-5 tablets PO 1 hour prior to intercourse Strength: 20 mg 40 tablet 4    sodium bicarbonate 325 MG  "tablet Take 1 tablet (325 mg) by mouth 2 times daily 180 tablet 3    vitamin B complex with vitamin C (STRESS TAB) tablet Take 2 tablets by mouth daily      VITAMIN D, CHOLECALCIFEROL, PO Take 2,000 Units by mouth daily       No Known Allergies  Current providers sharing in care for this patient include:  Patient Care Team:  Diana Bess MD as PCP - General (Family Medicine)  Diana Bess MD as Referring Physician (Family Practice)  Nissen, Rick L, MD as MD (Otolaryngology)  Katarzyna Mcdonough AuD as Audiologist (Audiology)  Diana Bess MD as Assigned PCP  Cheryl Villareal MD as Assigned Pulmonology Provider    The following health maintenance items are reviewed in Epic and correct as of today:  Health Maintenance   Topic Date Due    RSV VACCINE (Pregnancy & 60+) (1 - 1-dose 60+ series) Never done    LIPID  01/09/2024    MICROALBUMIN  04/28/2024    BMP  12/18/2024    HEMOGLOBIN  12/18/2024    DTAP/TDAP/TD IMMUNIZATION (2 - Td or Tdap) 12/26/2024    MEDICARE ANNUAL WELLNESS VISIT  02/19/2025    ANNUAL REVIEW OF HM ORDERS  02/19/2025    FALL RISK ASSESSMENT  02/19/2025    GLUCOSE  12/18/2026    ADVANCE CARE PLANNING  01/10/2028    HEPATITIS C SCREENING  Completed    PHQ-2 (once per calendar year)  Completed    INFLUENZA VACCINE  Completed    Pneumococcal Vaccine: 65+ Years  Completed    URINALYSIS  Completed    ZOSTER IMMUNIZATION  Completed    COVID-19 Vaccine  Completed    IPV IMMUNIZATION  Aged Out    HPV IMMUNIZATION  Aged Out    MENINGITIS IMMUNIZATION  Aged Out    RSV MONOCLONAL ANTIBODY  Aged Out    COLORECTAL CANCER SCREENING  Discontinued    LUNG CANCER SCREENING  Discontinued          Objective    Exam  /78 (BP Location: Left arm, Patient Position: Left side, Cuff Size: Adult Large)   Pulse 69   Temp 97.6  F (36.4  C) (Oral)   Resp 18   Ht 1.727 m (5' 8\")   Wt 79.8 kg (176 lb)   SpO2 97%   BMI 26.76 kg/m     Estimated body mass index is 26.76 kg/m  as calculated from the " "following:    Height as of this encounter: 1.727 m (5' 8\").    Weight as of this encounter: 79.8 kg (176 lb).    Physical Exam  GENERAL: alert and no distress  EYES: Eyes grossly normal to inspection, PERRL and conjunctivae and sclerae normal  HENT: Cerumen occlusion right ear canal; Left ear normal.   NECK: no adenopathy, no asymmetry, masses, or scars  RESP: lungs clear to auscultation - no rales, rhonchi or wheezes  CV: regular rate and rhythm, normal S1 S2, no S3 or S4, no murmur, click or rub, no peripheral edema  ABDOMEN: soft, nontender, no hepatosplenomegaly, no masses and bowel sounds normal  MS: no gross musculoskeletal defects noted, no edema  SKIN: no suspicious lesions or rashes  NEURO: Normal strength and tone, mentation intact and speech normal  PSYCH: mentation appears normal, affect normal/bright  FOOT: Normal dorsalis pedis pulse.  Normal gross appearance of the foot.  Monofilament test sensation was normal at the toes.  No reproducible tenderness palpating the bottom part of his foot or heel.    PROCEDURE: Our nurse successfully irrigated the wax from his right ear canal.  The patient tolerated the procedure well.         2/19/2024   Mini Cog   Clock Draw Score 2 Normal   3 Item Recall 3 objects recalled   Mini Cog Total Score 5            Signed Electronically by: DIEGO CHOWDHURY MD    "

## 2024-02-20 LAB
CHOLEST SERPL-MCNC: 250 MG/DL
FASTING STATUS PATIENT QL REPORTED: NO
HDLC SERPL-MCNC: 63 MG/DL
LDLC SERPL CALC-MCNC: 172 MG/DL
NONHDLC SERPL-MCNC: 187 MG/DL
TRIGL SERPL-MCNC: 73 MG/DL
TSH SERPL DL<=0.005 MIU/L-ACNC: 1.05 UIU/ML (ref 0.3–4.2)
VIT B12 SERPL-MCNC: 1033 PG/ML (ref 232–1245)

## 2024-02-22 ENCOUNTER — MYC REFILL (OUTPATIENT)
Dept: FAMILY MEDICINE | Facility: CLINIC | Age: 79
End: 2024-02-22
Payer: COMMERCIAL

## 2024-02-22 ENCOUNTER — MYC MEDICAL ADVICE (OUTPATIENT)
Dept: FAMILY MEDICINE | Facility: CLINIC | Age: 79
End: 2024-02-22
Payer: COMMERCIAL

## 2024-02-22 DIAGNOSIS — I10 ESSENTIAL HYPERTENSION: ICD-10-CM

## 2024-02-22 DIAGNOSIS — E78.5 HYPERLIPIDEMIA, UNSPECIFIED HYPERLIPIDEMIA TYPE: Primary | ICD-10-CM

## 2024-02-22 DIAGNOSIS — M51.369 DEGENERATION OF INTERVERTEBRAL DISC OF LUMBAR REGION: ICD-10-CM

## 2024-02-22 RX ORDER — AMLODIPINE BESYLATE 10 MG/1
10 TABLET ORAL DAILY
Qty: 90 TABLET | Refills: 3 | Status: SHIPPED | OUTPATIENT
Start: 2024-02-22

## 2024-02-22 RX ORDER — LOSARTAN POTASSIUM 50 MG/1
50 TABLET ORAL DAILY
Qty: 90 TABLET | Refills: 3 | OUTPATIENT
Start: 2024-02-22

## 2024-02-22 RX ORDER — GABAPENTIN 300 MG/1
600 CAPSULE ORAL 3 TIMES DAILY
Qty: 360 CAPSULE | Refills: 3 | Status: SHIPPED | OUTPATIENT
Start: 2024-02-22

## 2024-02-22 RX ORDER — ROSUVASTATIN CALCIUM 10 MG/1
10 TABLET, COATED ORAL DAILY
Qty: 30 TABLET | Refills: 1 | Status: SHIPPED | OUTPATIENT
Start: 2024-02-22 | End: 2024-04-10

## 2024-02-23 RX ORDER — LOSARTAN POTASSIUM 50 MG/1
50 TABLET ORAL DAILY
Qty: 90 TABLET | Refills: 3 | OUTPATIENT
Start: 2024-02-23

## 2024-02-27 RX ORDER — LOSARTAN POTASSIUM 50 MG/1
50 TABLET ORAL DAILY
Qty: 90 TABLET | Refills: 3 | Status: SHIPPED | OUTPATIENT
Start: 2024-02-27

## 2024-02-27 NOTE — TELEPHONE ENCOUNTER
Patient calling to report that the pharmacy does not have any remaining fills of losartan. Patient will be out of medication in 2 days.

## 2024-03-13 ENCOUNTER — OFFICE VISIT (OUTPATIENT)
Dept: NEUROLOGY | Facility: CLINIC | Age: 79
End: 2024-03-13
Payer: COMMERCIAL

## 2024-03-13 DIAGNOSIS — R20.0 NUMBNESS AND TINGLING OF FOOT: ICD-10-CM

## 2024-03-13 DIAGNOSIS — R20.2 NUMBNESS AND TINGLING OF FOOT: ICD-10-CM

## 2024-03-13 PROCEDURE — 95886 MUSC TEST DONE W/N TEST COMP: CPT | Mod: LT | Performed by: PSYCHIATRY & NEUROLOGY

## 2024-03-13 PROCEDURE — 95910 NRV CNDJ TEST 7-8 STUDIES: CPT | Performed by: PSYCHIATRY & NEUROLOGY

## 2024-03-13 NOTE — PROGRESS NOTES
[facility]  Electrodiagnostic Laboratory                 Department of Neurology                                                                                                         Test Date:  3/13/2024    Patient: Jairo Farr : 1945 Physician: Day Thompson MD   Sex: Male AGE: 78 year Ref Phys: Dr. Bess   ID#: [id number]   Technician: Tiffany Dumont     History and Examination:  The patient is a 78-year-old gentleman who reports several years worth of bilateral numbness on the bottom of the feet that is also painful and made worse by prolonged standing.    Techniques:  Motor conduction studies were done with surface recording electrodes. Sensory conduction studies were performed with surface electrodes, unless indicated otherwise by (n), designating the use of subdermal recording electrodes. Temperature was monitored and recorded throughout the study. Upper extremities were maintained at a temperature of 32 degrees Centigrade or higher.  EMG was done with a concentric needle electrode.     Results:  Motor nerve study:  Left fibular motor study reveals normal distal latency, low amplitude and normal conduction velocity.   Right fibular motor study reveals normal distal latency and amplitude  Left tibial motor study reveals normal distal latency, low amplitude and slowed conduction velocity  Right tibial motor study reveals normal distal latency and low amplitude  Left median motor study reveals normal distal latency, amplitude and conduction velocity    No response with left fibular F-wave nor tibial F wave stimulation    Sensory nerve studies:  No response with right sural nerve stimulation  Left sural nerve stimulation reveals a low amplitude response with normal conduction velocity  Left radial stimulation reveals a low amplitude response    Needle examination:  Needle examination of the left lower extremity reveals chronic neurogenic changes distally that  normalized more proximally.  No evidence of active denervation.    Interpretation:  This is an abnormal EMG.  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  This would be mild to moderate in severity.    ___________________________  Day Thompson MD        Nerve Conduction Studies  Motor Sites      Latency Amplitude Neg. Amp Diff Segment Distance Velocity Neg. Dur Neg Area Diff Temperature Comment   Site (ms) Norm (mV) Norm (%)  cm m/s Norm (ms) (%) ( C)    Left Fibular (EDB) Motor   Ankle 4.1  < 6.0 1.84  > 2.0  Ankle-EDB 8   6.0  31.7 moved G1   Bel Fib Head 12.5 - 1.28 - -30 Bel Fib Head-Ankle 32.5 39  > 38 7.9 -7 32    Pop Fossa 14.7 - 1.13 - -12 Pop Fossa-Bel Fib Head 10 45  > 38 7.8 -8 32    Right Fibular (EDB) Motor   Ankle 4.3  < 6.0 2.1  > 2.0  Ankle-EDB 8   5.4  32.2    Left Median (APB) Motor   Wrist 4.0  < 4.4 8.6  > 5.0  Wrist-APB 8.5   4.6  33.2    Elbow 8.7 - 8.4  > 5.0 -2 Elbow-Wrist 24.8 53  > 48 4.6 -4 33.2    Left Tibial (AHB) Motor   Ankle 3.8  < 6.5 4.6  > 5.0  Ankle-AHB 7.5   6.5  32.1 moved G1   Knee 15.9 - 2.5 - -46 Knee-Ankle 42 35  > 38 6.9 -42 32.2    Right Tibial (AHB) Motor   Ankle 3.4  < 6.5 4.3  > 5.0  Ankle-AHB 6.5   6.4  32.2 moved G1     F-Wave Sites      Min F-Lat Max-Min F-Lat Mean F-Lat   Site (ms) (ms) (ms)   Left Fibular F-Wave   Ankle NR NR NR   Left Tibial F-Wave   Ankle NR NR NR     Sensory Sites      Onset Lat Peak Lat Amp (O-P) Amp (P-P) Segment Distance Velocity Temperature Comment   Site ms (ms)  V Norm ( V)  cm m/s Norm ( C)    Left Radial Sensory   Forearm-Wrist 1.78 2.4 7  > 15 16 Forearm-Wrist 10 56 - 32.8    Left Sural Sensory   Calf-Lat Mall 2.8 3.6 1  > 5 3 Calf-Lat Mall 13.5 48  > 38 32.2    Right Sural Sensory   Calf-Lat Mall NR NR NR  > 5 NR Calf-Lat Mall 14 NR  > 38 32.2        Electromyography     Side Muscle Ins Act Fibs/PSW Fasc HF Amp Dur Poly Recrt Int Pat   Left Tib Anterior Nml  None Nml 0 Nml Nml 0 Nml Nml   Left Gastroc Nml None Nml 0 Nml Nml 0 Nml Nml   Left Ext Durand Long Nml None Nml 0 1+ 1+ 0 Aaron Nml   Left Vastus Lat Nml None Nml 0 Nml Nml 0 Nml Nml   Left TensFascLat Nml None Nml 0 Nml Nml 0 Nml Nml         NCS Waveforms:    Motor                  Sensory           F-Wave

## 2024-03-13 NOTE — LETTER
3/13/2024         RE: Crispin Farr  [address]  [place] [address]        Dear Colleague,    Thank you for referring your patient, Crispin Farr, to the [facility]. Please see a copy of my visit note below.                            [facility]  Electrodiagnostic Laboratory                 Department of Neurology                                                                                                         Test Date:  3/13/2024    Patient: Jairo Farr : 1945 Physician: Day Thompson MD   Sex: Male AGE: 78 year Ref Phys: Dr. Bess   ID#: [id number]   Technician: Tiffany Dumont     History and Examination:  The patient is a 78-year-old gentleman who reports several years worth of bilateral numbness on the bottom of the feet that is also painful and made worse by prolonged standing.    Techniques:  Motor conduction studies were done with surface recording electrodes. Sensory conduction studies were performed with surface electrodes, unless indicated otherwise by (n), designating the use of subdermal recording electrodes. Temperature was monitored and recorded throughout the study. Upper extremities were maintained at a temperature of 32 degrees Centigrade or higher.  EMG was done with a concentric needle electrode.     Results:  Motor nerve study:  Left fibular motor study reveals normal distal latency, low amplitude and normal conduction velocity.   Right fibular motor study reveals normal distal latency and amplitude  Left tibial motor study reveals normal distal latency, low amplitude and slowed conduction velocity  Right tibial motor study reveals normal distal latency and low amplitude  Left median motor study reveals normal distal latency, amplitude and conduction velocity    No response with left fibular F-wave nor tibial F wave stimulation    Sensory nerve studies:  No response with right sural nerve stimulation  Left sural  nerve stimulation reveals a low amplitude response with normal conduction velocity  Left radial stimulation reveals a low amplitude response    Needle examination:  Needle examination of the left lower extremity reveals chronic neurogenic changes distally that normalized more proximally.  No evidence of active denervation.    Interpretation:  This is an abnormal EMG.  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  This would be mild to moderate in severity.    ___________________________  Day Thompson MD        Nerve Conduction Studies  Motor Sites      Latency Amplitude Neg. Amp Diff Segment Distance Velocity Neg. Dur Neg Area Diff Temperature Comment   Site (ms) Norm (mV) Norm (%)  cm m/s Norm (ms) (%) ( C)    Left Fibular (EDB) Motor   Ankle 4.1  < 6.0 1.84  > 2.0  Ankle-EDB 8   6.0  31.7 moved G1   Bel Fib Head 12.5 - 1.28 - -30 Bel Fib Head-Ankle 32.5 39  > 38 7.9 -7 32    Pop Fossa 14.7 - 1.13 - -12 Pop Fossa-Bel Fib Head 10 45  > 38 7.8 -8 32    Right Fibular (EDB) Motor   Ankle 4.3  < 6.0 2.1  > 2.0  Ankle-EDB 8   5.4  32.2    Left Median (APB) Motor   Wrist 4.0  < 4.4 8.6  > 5.0  Wrist-APB 8.5   4.6  33.2    Elbow 8.7 - 8.4  > 5.0 -2 Elbow-Wrist 24.8 53  > 48 4.6 -4 33.2    Left Tibial (AHB) Motor   Ankle 3.8  < 6.5 4.6  > 5.0  Ankle-AHB 7.5   6.5  32.1 moved G1   Knee 15.9 - 2.5 - -46 Knee-Ankle 42 35  > 38 6.9 -42 32.2    Right Tibial (AHB) Motor   Ankle 3.4  < 6.5 4.3  > 5.0  Ankle-AHB 6.5   6.4  32.2 moved G1     F-Wave Sites      Min F-Lat Max-Min F-Lat Mean F-Lat   Site (ms) (ms) (ms)   Left Fibular F-Wave   Ankle NR NR NR   Left Tibial F-Wave   Ankle NR NR NR     Sensory Sites      Onset Lat Peak Lat Amp (O-P) Amp (P-P) Segment Distance Velocity Temperature Comment   Site ms (ms)  V Norm ( V)  cm m/s Norm ( C)    Left Radial Sensory   Forearm-Wrist 1.78 2.4 7  > 15 16 Forearm-Wrist 10 56 - 32.8    Left Sural Sensory   Calf-Lat  Mall 2.8 3.6 1  > 5 3 Calf-Lat Mall 13.5 48  > 38 32.2    Right Sural Sensory   Calf-Lat Mall NR NR NR  > 5 NR Calf-Lat Mall 14 NR  > 38 32.2        Electromyography     Side Muscle Ins Act Fibs/PSW Fasc HF Amp Dur Poly Recrt Int Pat   Left Tib Anterior Nml None Nml 0 Nml Nml 0 Nml Nml   Left Gastroc Nml None Nml 0 Nml Nml 0 Nml Nml   Left Ext Durand Long Nml None Nml 0 1+ 1+ 0 Aaron Nml   Left Vastus Lat Nml None Nml 0 Nml Nml 0 Nml Nml   Left TensFascLat Nml None Nml 0 Nml Nml 0 Nml Nml         NCS Waveforms:    Motor                  Sensory           F-Wave                       Again, thank you for allowing me to participate in the care of your patient.        Sincerely,        Day Thompson MD

## 2024-03-21 ENCOUNTER — LAB (OUTPATIENT)
Dept: LAB | Facility: CLINIC | Age: 79
End: 2024-03-21
Payer: COMMERCIAL

## 2024-03-21 DIAGNOSIS — E78.5 HYPERLIPIDEMIA, UNSPECIFIED HYPERLIPIDEMIA TYPE: ICD-10-CM

## 2024-03-21 PROCEDURE — 80061 LIPID PANEL: CPT

## 2024-03-21 PROCEDURE — 36415 COLL VENOUS BLD VENIPUNCTURE: CPT

## 2024-03-21 PROCEDURE — 84460 ALANINE AMINO (ALT) (SGPT): CPT

## 2024-03-22 LAB
ALT SERPL W P-5'-P-CCNC: 36 U/L (ref 0–70)
CHOLEST SERPL-MCNC: 160 MG/DL
FASTING STATUS PATIENT QL REPORTED: YES
HDLC SERPL-MCNC: 65 MG/DL
LDLC SERPL CALC-MCNC: 85 MG/DL
NONHDLC SERPL-MCNC: 95 MG/DL
TRIGL SERPL-MCNC: 50 MG/DL

## 2024-03-24 DIAGNOSIS — R73.9 HYPERGLYCEMIA: ICD-10-CM

## 2024-03-24 DIAGNOSIS — G62.89 AXONAL NEUROPATHY: Primary | ICD-10-CM

## 2024-03-24 DIAGNOSIS — Z13.29 SCREENING FOR THYROID DISORDER: ICD-10-CM

## 2024-03-25 ENCOUNTER — TELEPHONE (OUTPATIENT)
Dept: FAMILY MEDICINE | Facility: CLINIC | Age: 79
End: 2024-03-25
Payer: COMMERCIAL

## 2024-03-25 NOTE — TELEPHONE ENCOUNTER
Left message to call back for: Don  Information to relay to patient: LMTRC~ If pt calls back ok to relay message to pt and make an appt for future blood work that is ordered.

## 2024-03-25 NOTE — TELEPHONE ENCOUNTER
----- Message from Diana Bess MD sent at 3/24/2024  4:01 PM CDT -----  Please call patient and let him know that I reviewed his EMG; he does have confirmed peripheral neuropathy. The recommendation is to proceed with an initial battery of tests to try to figure out what the underlying cause is and if the tests came back normal, then I would advise a formal consult with neurologist.

## 2024-03-27 ENCOUNTER — LAB (OUTPATIENT)
Dept: LAB | Facility: CLINIC | Age: 79
End: 2024-03-27
Payer: COMMERCIAL

## 2024-03-27 DIAGNOSIS — D47.2 MONOCLONAL PARAPROTEINEMIA: ICD-10-CM

## 2024-03-27 DIAGNOSIS — R73.9 HYPERGLYCEMIA: ICD-10-CM

## 2024-03-27 DIAGNOSIS — Z13.29 SCREENING FOR THYROID DISORDER: ICD-10-CM

## 2024-03-27 DIAGNOSIS — G62.89 AXONAL NEUROPATHY: ICD-10-CM

## 2024-03-27 LAB — HBA1C MFR BLD: 5.2 % (ref 0–5.6)

## 2024-03-27 PROCEDURE — 84443 ASSAY THYROID STIM HORMONE: CPT | Mod: GZ

## 2024-03-27 PROCEDURE — 36415 COLL VENOUS BLD VENIPUNCTURE: CPT

## 2024-03-27 PROCEDURE — 84165 PROTEIN E-PHORESIS SERUM: CPT | Performed by: PATHOLOGY

## 2024-03-27 PROCEDURE — 86038 ANTINUCLEAR ANTIBODIES: CPT

## 2024-03-27 PROCEDURE — 86334 IMMUNOFIX E-PHORESIS SERUM: CPT | Performed by: PATHOLOGY

## 2024-03-27 PROCEDURE — 84155 ASSAY OF PROTEIN SERUM: CPT

## 2024-03-27 PROCEDURE — 83036 HEMOGLOBIN GLYCOSYLATED A1C: CPT

## 2024-03-27 PROCEDURE — 84166 PROTEIN E-PHORESIS/URINE/CSF: CPT | Performed by: PATHOLOGY

## 2024-03-27 NOTE — TELEPHONE ENCOUNTER
Patient notified of provider's message as written, patient verbalized understanding.  Lab only appointment scheduled.   Encouraged patient to call the clinic with further questions/concerns.     Oscar SERRA RN  [facility]

## 2024-03-28 LAB
ALBUMIN SERPL ELPH-MCNC: 4.8 G/DL (ref 3.7–5.1)
ALPHA1 GLOB SERPL ELPH-MCNC: 0.3 G/DL (ref 0.2–0.4)
ALPHA2 GLOB SERPL ELPH-MCNC: 0.6 G/DL (ref 0.5–0.9)
ANA SER QL IF: NEGATIVE
B-GLOBULIN SERPL ELPH-MCNC: 0.9 G/DL (ref 0.6–1)
GAMMA GLOB SERPL ELPH-MCNC: 0.8 G/DL (ref 0.7–1.6)
M PROTEIN SERPL ELPH-MCNC: 0.2 G/DL
PROT PATTERN SERPL ELPH-IMP: ABNORMAL
PROT PATTERN UR ELPH-IMP: NORMAL
TOTAL PROTEIN SERUM FOR ELP: 7.4 G/DL (ref 6.4–8.3)
TSH SERPL DL<=0.005 MIU/L-ACNC: 1.51 UIU/ML (ref 0.3–4.2)

## 2024-04-01 ENCOUNTER — TELEPHONE (OUTPATIENT)
Dept: FAMILY MEDICINE | Facility: CLINIC | Age: 79
End: 2024-04-01
Payer: COMMERCIAL

## 2024-04-01 DIAGNOSIS — D47.2 MONOCLONAL PARAPROTEINEMIA: Primary | ICD-10-CM

## 2024-04-01 LAB — PROT PATTERN SERPL IFE-IMP: NORMAL

## 2024-04-01 NOTE — TELEPHONE ENCOUNTER
Left message for:  Results  Information to Relay:  Please see below notes and relay upon return call

## 2024-04-01 NOTE — TELEPHONE ENCOUNTER
----- Message from Diana Bess MD sent at 4/1/2024  7:34 AM CDT -----  Please call patient and let him know that there was a very small abnormality in his blood suggesting an elevation in the monoclonal protein.  They are recommending an additional couple of tests.  I was able to add the blood test on called serum immunofixation for confirmation.  However, they are also recommending a urine immunofixation and he would need to come into provide a sample.

## 2024-04-05 NOTE — TELEPHONE ENCOUNTER
Left message to call back for: patient   Information to relay to patient: see providers message below and relay

## 2024-04-07 DIAGNOSIS — D47.2 MONOCLONAL PARAPROTEINEMIA: Primary | ICD-10-CM

## 2024-04-07 DIAGNOSIS — G62.89 AXONAL NEUROPATHY: ICD-10-CM

## 2024-04-08 ENCOUNTER — TELEPHONE (OUTPATIENT)
Dept: FAMILY MEDICINE | Facility: CLINIC | Age: 79
End: 2024-04-08
Payer: COMMERCIAL

## 2024-04-08 ENCOUNTER — PATIENT OUTREACH (OUTPATIENT)
Dept: ONCOLOGY | Facility: CLINIC | Age: 79
End: 2024-04-08
Payer: COMMERCIAL

## 2024-04-08 NOTE — TELEPHONE ENCOUNTER
Left message to call back for:  Don  Information to relay to patient:  Please ask patient for more information on neurology referral and who he spoke with.  Please assist patient in connecting with  to schedule Neurology consult for peripheral/axonal neuropathy.      Patient also had questions about his protein immunofixation serum levels.  Dr. Bess tried to reach patient 4/7/24 (per lab results note from 3/27/24 lab) but unable to reach patient.  Please ask when a good time to call would be.      Malou Cuevas RN  [facility]

## 2024-04-08 NOTE — TELEPHONE ENCOUNTER
"----- Message from Diana Bess MD sent at 4/7/2024 12:15 PM CDT -----  Please call patient; I tried calling today but was unable to reach him. He does have a small paraprotein elevation \"MGUS\" which is quite common. However, he will need further risk stratigication and monitoring and I advise a consult with a hematologist as a next step. I will place a referral. I also would like him to formally consult with a neurologist regarding his peripheral neuropathy.  "

## 2024-04-08 NOTE — PROGRESS NOTES
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 Bess for well check.   Per Dr. Bess note patient has a history of MGUS but has been seen annually at [facility] for this.      Records Location: Media and See Bookmarked material     Records Needed: NA     Additional testing needed prior to consult: NA    Payor: [employer] / Plan: [employer] MEDICARE ADVANTAGE / Product Type: Medicare /     April 8, 2024  Referral received and reviewed. Patient already etablished at [facility] with Dr. Romeo Beach. Last seen on 1/11/2024. Seen annually. Message sent to referring MD. Referral closed.     Clover PRADO, RN   Oncology Nurse Navigator   [facility]   [phone] / [phone]

## 2024-04-08 NOTE — TELEPHONE ENCOUNTER
Patient calling to state he received a call from Order: Adult Neurology  Referral    They stated unable to schedule do to the clinic is unsure why this order was requested due to the diagnosis and who he should see for it     A different Neurologist maybe needed     Patient looking for a call back on this     Patient also wondering about his Protein immunofixation urine   -Call back to help schedule and explain what it is for     Call Back   Contact Information  [phone] (Mobile)

## 2024-04-08 NOTE — TELEPHONE ENCOUNTER
FYI - Status Update    Who is Calling: patient    Update: Pt updated as to previous message and given hematology number and directed to make that appt.    Does caller want a call/response back: No

## 2024-04-10 ENCOUNTER — MYC REFILL (OUTPATIENT)
Dept: FAMILY MEDICINE | Facility: CLINIC | Age: 79
End: 2024-04-10
Payer: COMMERCIAL

## 2024-04-10 DIAGNOSIS — E78.5 HYPERLIPIDEMIA, UNSPECIFIED HYPERLIPIDEMIA TYPE: ICD-10-CM

## 2024-04-10 NOTE — TELEPHONE ENCOUNTER
"Patient has questions about his protein immunofixation serum levels and requesting callback from Dr. Bess to discuss.  States mornings before 10AM usually work best for a callback.     Of note, Dr. Bess tried to reach patient 4/7/24 (per lab results note from 3/27/24 lab) but was unable to reach them; lab results note from 4/7/24 copied/pasted below:    \"Diana Bess MD  4/7/2024 12:15 PM CDT   Patient has questions about his protein immunofixation serum levels and requesting callback from Dr. Bess to discuss.  States mornings before 10AM usually work best for a callback.\"       Message routed to Dr. Bess.      Malou Cuevas RN  [facility]   "

## 2024-04-10 NOTE — TELEPHONE ENCOUNTER
Relayed below. Gave correct scheduling numbers.  Pt has additional questions and states that mornings before 10AM usually work best for a callback.

## 2024-04-11 ENCOUNTER — LAB (OUTPATIENT)
Dept: LAB | Facility: CLINIC | Age: 79
End: 2024-04-11
Payer: COMMERCIAL

## 2024-04-11 ENCOUNTER — OFFICE VISIT (OUTPATIENT)
Dept: NEUROLOGY | Facility: CLINIC | Age: 79
End: 2024-04-11
Attending: FAMILY MEDICINE
Payer: COMMERCIAL

## 2024-04-11 VITALS
RESPIRATION RATE: 16 BRPM | WEIGHT: 170 LBS | HEART RATE: 72 BPM | BODY MASS INDEX: 25.85 KG/M2 | SYSTOLIC BLOOD PRESSURE: 136 MMHG | DIASTOLIC BLOOD PRESSURE: 76 MMHG

## 2024-04-11 DIAGNOSIS — Z82.0 FAMILY HISTORY OF NEUROPATHY: ICD-10-CM

## 2024-04-11 DIAGNOSIS — G62.9 NEUROPATHY: ICD-10-CM

## 2024-04-11 DIAGNOSIS — G62.89 AXONAL NEUROPATHY: ICD-10-CM

## 2024-04-11 DIAGNOSIS — D47.2 MONOCLONAL PARAPROTEINEMIA: ICD-10-CM

## 2024-04-11 DIAGNOSIS — M79.672 LEFT FOOT PAIN: Primary | ICD-10-CM

## 2024-04-11 DIAGNOSIS — E83.00 COPPER METABOLISM DISORDER (H): ICD-10-CM

## 2024-04-11 PROCEDURE — 36415 COLL VENOUS BLD VENIPUNCTURE: CPT

## 2024-04-11 PROCEDURE — 99205 OFFICE O/P NEW HI 60 MIN: CPT | Performed by: PSYCHIATRY & NEUROLOGY

## 2024-04-11 PROCEDURE — 84425 ASSAY OF VITAMIN B-1: CPT | Mod: 90

## 2024-04-11 PROCEDURE — 86335 IMMUNFIX E-PHORSIS/URINE/CSF: CPT | Performed by: PATHOLOGY

## 2024-04-11 PROCEDURE — 82525 ASSAY OF COPPER: CPT | Mod: 90

## 2024-04-11 PROCEDURE — 84207 ASSAY OF VITAMIN B-6: CPT | Mod: 90

## 2024-04-11 PROCEDURE — 99000 SPECIMEN HANDLING OFFICE-LAB: CPT

## 2024-04-11 PROCEDURE — 86618 LYME DISEASE ANTIBODY: CPT

## 2024-04-11 RX ORDER — ROSUVASTATIN CALCIUM 10 MG/1
10 TABLET, COATED ORAL DAILY
Qty: 90 TABLET | Refills: 1 | Status: SHIPPED | OUTPATIENT
Start: 2024-04-11

## 2024-04-11 NOTE — LETTER
"    4/11/2024         RE: Crispin Farr  [address] [place]  [place] [address]        Dear Colleague,    Thank you for referring your patient, Crispin Farr, to the [facility] [place]. Please see a copy of my visit note below.    NEUROLOGY OUTPATIENT CONSULT NOTE   Apr 11, 2024     CHIEF COMPLAINT/REASON FOR VISIT/REASON FOR CONSULT  Patient presents with:  Consult    REASON FOR CONSULTATION-foot pain/numbness.    REFERRAL SOURCE  Dr. Diana Bess  CC Dr. Diana Bess    HISTORY OF PRESENT ILLNESS  Crispin Farr is a 78 year old male seen today for evaluation of foot pain/numbness.  Symptoms started about June 2022.  He describes numbness and tingling in his toes and the balls of his feet.  Does have deep pain in his heels.  This is worse with standing and putting pressure on the foot.  Touching the skin does not make the pain worse.  Denies any pins-and-needles or burning pain.  He was carrying a lot of stuff and walking on hard surfaces in June when the symptoms came on.  Does not have any history of diabetes.  He has been taking vitamin B6 supplements for several years.  Blood work done through primary care has shown abnormal serum for electrophoresis.  Multiple family members in his family have neuropathy.  Reports no weight gain.  Have chronic back pain with no worsening.  No shooting pain down the legs.  No weakness or balance issues.  No back surgeries in the past.  For his pain he is on gabapentin and Tylenol which is helping.    Does have occasional positional numbness of his right hand.  No other numbness in the hands.  No major neck pain.    Previous history is reviewed and this is unchanged.    PAST MEDICAL/SURGICAL HISTORY  Past Medical History:   Diagnosis Date     Anemia     mild     Chronic low back pain      Fingernail abnormalities     \"changes in fingernails\"      Hypertension      Inability to maintain erection      Lumbar spine scoliosis      Patient " Active Problem List   Diagnosis     Degeneration of intervertebral disc of lumbar region     Essential hypertension     Stage 3a chronic kidney disease (H)     Family history of colon cancer     Erectile dysfunction, unspecified erectile dysfunction type     MGUS (monoclonal gammopathy of unknown significance)   Significant high cholesterol, high blood pressure    FAMILY HISTORY  Family History   Problem Relation Age of Onset     Coronary Artery Disease Father      Colon Cancer Cousin      Depression Brother    Significant stroke, heart attack, high cholesterol, high blood pressure, tremors, memory loss, vision loss.  His mother and sisters might have neuropathy.    SOCIAL HISTORY  Social History     Tobacco Use     Smoking status: Former     Smokeless tobacco: Never   Vaping Use     Vaping status: Never Used   Substance Use Topics     Alcohol use: Not Currently     Alcohol/week: 3.0 standard drinks of alcohol     Drug use: Never       SYSTEMS REVIEW  Twelve-system ROS was done and other than the HPI this was negative/positive for back pain, joint pain, numbness/tingling, movement disorder with right hand shaking on the computer, bladder symptoms, impotence.    MEDICATIONS  Current Outpatient Medications   Medication Sig Dispense Refill     amLODIPine (NORVASC) 10 MG tablet Take 1 tablet (10 mg) by mouth daily 90 tablet 3     cyanocobalamin (VITAMIN B-12) 100 MCG tablet Take 100 mcg by mouth daily       folic acid (FOLVITE) 1 MG tablet Take 1 tablet by mouth       gabapentin (NEURONTIN) 300 MG capsule Take 2 capsules (600 mg) by mouth 3 times daily 360 capsule 3     losartan (COZAAR) 50 MG tablet Take 1 tablet (50 mg) by mouth daily 90 tablet 3     Omega-3 Fatty Acids (OMEGA-3 FISH OIL PO) Take 1,200 mg by mouth daily        pyridOXINE (VITAMIN B6) 100 MG TABS        rosuvastatin (CRESTOR) 10 MG tablet Take 1 tablet (10 mg) by mouth daily 90 tablet 1     sildenafil (REVATIO) 20 MG tablet Take 3-5 tablets PO 1 hour  prior to intercourse Strength: 20 mg 40 tablet 4     sodium bicarbonate 325 MG tablet Take 1 tablet (325 mg) by mouth 2 times daily 180 tablet 3     vitamin B complex with vitamin C (STRESS TAB) tablet Take 2 tablets by mouth daily       VITAMIN D, CHOLECALCIFEROL, PO Take 2,000 Units by mouth daily       No current facility-administered medications for this visit.        PHYSICAL EXAMINATION  VITALS: /76   Pulse 72   Resp 16   Wt 77.1 kg (170 lb)   BMI 25.85 kg/m    GENERAL: Healthy appearing, alert, no acute distress, normal habitus.  CARDIOVASCULAR: Extremities warm and well perfused. Pulses present.   NEUROLOGICAL:  Patient is awake and oriented to self, place and time.  Attention span is normal.  Memory is grossly intact.  Language is fluent and follows commands appropriately.  Appropriate fund of knowledge. Cranial nerves 2-12 are intact. There is no pronator drift.  Motor exam shows 5/5 strength in all extremities.  Tone is symmetric bilaterally in upper and lower extremities.  Reflexes are symmetric and 2+ in upper extremities and lower extremities.  Ankle jerks absent.  Sensory exam is grossly intact to light touch, pin prick and vibration with decreased sensation to the midshin level.  Finger to nose and heel to shin is without dysmetria.  Romberg is negative.  Gait is normal and the patient is able to do tandem walk and walk on toes and heels with some difficulty. Mild vocal tremor.    DIAGNOSTICS  MRI  IMPRESSION:  1.  Severe spinal canal stenosis at L4-L5 and L3-L4.  2.  Mild spinal canal stenosis at L2-L3 and L1-L2.  3.  Severe right foraminal stenosis at L3-L4 with ganglionic impingement.  4.  Moderate to severe left foraminal stenosis at L4-L5 with exiting nerve root contact/impingement.  5.  Levoconvex degenerative curvature.  6.  Endplate and facet edema consistent with active degenerative disc disease and facet disease and may function as a source of low back pain. Correlate  clinically.    EMG 2024  Interpretation:  This is an abnormal EMG.  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  This would be mild to moderate in severity.     RELEVANT LABS  Component      Latest Ref Rng 12/18/2023  4:22 PM 2/19/2024  12:49 PM 3/27/2024  1:11 PM   Sodium      135 - 145 mmol/L 138      Potassium      3.4 - 5.3 mmol/L 4.5      Chloride      98 - 107 mmol/L 103      Carbon Dioxide (CO2)      22 - 29 mmol/L 24      Anion Gap      7 - 15 mmol/L 11      Glucose      70 - 99 mg/dL 119 (H)      Urea Nitrogen      8.0 - 23.0 mg/dL 20.1      Creatinine      0.67 - 1.17 mg/dL 1.20 (H)      GFR Estimate      >60 mL/min/1.73m2 62      Calcium      8.8 - 10.2 mg/dL 9.8      Albumin      3.5 - 5.2 g/dL 4.7      Phosphorus      2.5 - 4.5 mg/dL 3.2      Albumin Fraction      3.7 - 5.1 g/dL   4.8    Alpha 1 Fraction      0.2 - 0.4 g/dL   0.3    Alpha 2 Fraction      0.5 - 0.9 g/dL   0.6    Beta Fraction      0.6 - 1.0 g/dL   0.9    Gamma Fraction      0.7 - 1.6 g/dL   0.8    Monoclonal Peak      <=0.0 g/dL   0.2 (H)    ELP Interpretation:   Small monoclonal protein (0.2 g/dL) seen in the beta fraction, not previously characterized in our laboratory. Recommend serum and urine immunofixation for confirmation and further characterization if not previously performed elsewhere. Pathologic significance requires clinical correlation. Ela Wilson M.D.    Vitamin B12      232 - 1,245 pg/mL  1,033     WILLIAM interpretation      Negative    Negative    TSH      0.30 - 4.20 uIU/mL   1.51    Hemoglobin A1C      0.0 - 5.6 %   5.2    Total Protein Serum for ELP      6.4 - 8.3 g/dL   7.4    Immunofixation ELP   Monoclonal IgG immunoglobulin of kappa light chain type. Monoclonal free immunoglobulin light chain of lambda chain type. Monoclonal antibody therapeutics (e.g. Daratumumab) may appear as monoclonal proteins on serum electrophoresis and  immunofixation. Results should be interpreted with caution. Pathologic significance requires clinical correlation. Maximino Villa MD       Legend:  (H) High    OUTSIDE RECORDS  Outside referral notes and chart notes were reviewed and pertinent information has been summarized (in addition to the HPI):-        IMPRESSION/REPORT/PLAN  Foot pain  Abnormal serum electrophoresis/MGUS  Neuropathy  Family history of neuropathy  Vitamin B6 use  Lumbar spinal stenosis    This is a 78 year old male with numbness and pain in his feet.  His exam is suggestive of a neuropathy.  EMG is also confirmed diagnosis of neuropathy.  There is family history of neuropathy and his neuropathy could be related to genetic causes.  Other possibility would be idiopathic neuropathy.  He does have a positive serum protein electrophoresis and has been diagnosed with MGUS which could also be a cause for his neuropathy.  Furthermore he does take B6 supplements and B6 toxicity/B6 use has been associated with neuropathy.  Would like to check a B6 level and if that is normal would recommend stopping the B6 to see if his neuropathy improves.  Will check blood work to look for other causes of neuropathy.    His pain does not seem consistent with neuropathy since it is not pins-and-needles or burning in nature.  Is worse with activity and generally neuropathic pain is worse with rest.  Will refer him to podiatry to rule out other causes for his pain.  Can continue gabapentin and Tylenol which is helping.    Continue follow-up with hematology/oncology for MGUS.    I can see him back in about 6 months.  Discussed problems of neuropathy.  Recommend exercise and healthy lifestyle.    -     Orthopedic  Referral; Future  -     Vitamin B6; Future  -     Lyme Disease Total Abs Bld with Reflex to Confirm CLIA; Future  -     Copper level; Future  -     Vitamin B1 whole blood; Future  - Consideration to stop vitamin B6 supplementation.  - Continue  follow-up with hematology/oncology for MGUS.    Return in about 6 months (around 10/11/2024) for In-Clinic Visit (must), After testing.    Over 60 minutes were spent coordinating the care for the patient on the day of the encounter.  This includes previsit, during visit and post visit activities as documented above.  Counseling patient.  Reviewing chart.  Multiple test reviewed.  Testing ordered.  (Activities include but not inclusive of reviewing chart, reviewing outside records, reviewing labs and imaging study results as well as the images, patient visit time including getting history and exam,  use if applicable, review of test results with the patient and coming up with a plan in a shared model, counseling patient and family, education and answering patient questions, EMR , EMR diagnosis entry and problem list management, medication reconciliation and prescription management if applicable, paperwork if applicable, printing documents and documentation of the visit activities.)        Giovanni Martinez MD  Neurologist  [facility]  Tel:- [phone]    This note was dictated using voice recognition software.  Any grammatical or context distortions are unintentional and inherent to the software.      Again, thank you for allowing me to participate in the care of your patient.        Sincerely,        Giovanni Martinez MD

## 2024-04-11 NOTE — PROGRESS NOTES
"NEUROLOGY OUTPATIENT CONSULT NOTE   Apr 11, 2024     CHIEF COMPLAINT/REASON FOR VISIT/REASON FOR CONSULT  Patient presents with:  Consult    REASON FOR CONSULTATION-foot pain/numbness.    REFERRAL SOURCE  Dr. Diana Bess  CC Dr. Diana Bess    HISTORY OF PRESENT ILLNESS  Crispin Farr is a 78 year old male seen today for evaluation of foot pain/numbness.  Symptoms started about June 2022.  He describes numbness and tingling in his toes and the balls of his feet.  Does have deep pain in his heels.  This is worse with standing and putting pressure on the foot.  Touching the skin does not make the pain worse.  Denies any pins-and-needles or burning pain.  He was carrying a lot of stuff and walking on hard surfaces in June when the symptoms came on.  Does not have any history of diabetes.  He has been taking vitamin B6 supplements for several years.  Blood work done through primary care has shown abnormal serum for electrophoresis.  Multiple family members in his family have neuropathy.  Reports no weight gain.  Have chronic back pain with no worsening.  No shooting pain down the legs.  No weakness or balance issues.  No back surgeries in the past.  For his pain he is on gabapentin and Tylenol which is helping.    Does have occasional positional numbness of his right hand.  No other numbness in the hands.  No major neck pain.    Previous history is reviewed and this is unchanged.    PAST MEDICAL/SURGICAL HISTORY  Past Medical History:   Diagnosis Date    Anemia     mild    Chronic low back pain     Fingernail abnormalities     \"changes in fingernails\"     Hypertension     Inability to maintain erection     Lumbar spine scoliosis      Patient Active Problem List   Diagnosis    Degeneration of intervertebral disc of lumbar region    Essential hypertension    Stage 3a chronic kidney disease (H)    Family history of colon cancer    Erectile dysfunction, unspecified erectile dysfunction type    MGUS (monoclonal " gammopathy of unknown significance)   Significant high cholesterol, high blood pressure    FAMILY HISTORY  Family History   Problem Relation Age of Onset    Coronary Artery Disease Father     Colon Cancer Cousin     Depression Brother    Significant stroke, heart attack, high cholesterol, high blood pressure, tremors, memory loss, vision loss.  His mother and sisters might have neuropathy.    SOCIAL HISTORY  Social History     Tobacco Use    Smoking status: Former    Smokeless tobacco: Never   Vaping Use    Vaping status: Never Used   Substance Use Topics    Alcohol use: Not Currently     Alcohol/week: 3.0 standard drinks of alcohol    Drug use: Never       SYSTEMS REVIEW  Twelve-system ROS was done and other than the HPI this was negative/positive for back pain, joint pain, numbness/tingling, movement disorder with right hand shaking on the computer, bladder symptoms, impotence.    MEDICATIONS  Current Outpatient Medications   Medication Sig Dispense Refill    amLODIPine (NORVASC) 10 MG tablet Take 1 tablet (10 mg) by mouth daily 90 tablet 3    cyanocobalamin (VITAMIN B-12) 100 MCG tablet Take 100 mcg by mouth daily      folic acid (FOLVITE) 1 MG tablet Take 1 tablet by mouth      gabapentin (NEURONTIN) 300 MG capsule Take 2 capsules (600 mg) by mouth 3 times daily 360 capsule 3    losartan (COZAAR) 50 MG tablet Take 1 tablet (50 mg) by mouth daily 90 tablet 3    Omega-3 Fatty Acids (OMEGA-3 FISH OIL PO) Take 1,200 mg by mouth daily       pyridOXINE (VITAMIN B6) 100 MG TABS       rosuvastatin (CRESTOR) 10 MG tablet Take 1 tablet (10 mg) by mouth daily 90 tablet 1    sildenafil (REVATIO) 20 MG tablet Take 3-5 tablets PO 1 hour prior to intercourse Strength: 20 mg 40 tablet 4    sodium bicarbonate 325 MG tablet Take 1 tablet (325 mg) by mouth 2 times daily 180 tablet 3    vitamin B complex with vitamin C (STRESS TAB) tablet Take 2 tablets by mouth daily      VITAMIN D, CHOLECALCIFEROL, PO Take 2,000 Units by mouth  daily       No current facility-administered medications for this visit.        PHYSICAL EXAMINATION  VITALS: /76   Pulse 72   Resp 16   Wt 77.1 kg (170 lb)   BMI 25.85 kg/m    GENERAL: Healthy appearing, alert, no acute distress, normal habitus.  CARDIOVASCULAR: Extremities warm and well perfused. Pulses present.   NEUROLOGICAL:  Patient is awake and oriented to self, place and time.  Attention span is normal.  Memory is grossly intact.  Language is fluent and follows commands appropriately.  Appropriate fund of knowledge. Cranial nerves 2-12 are intact. There is no pronator drift.  Motor exam shows 5/5 strength in all extremities.  Tone is symmetric bilaterally in upper and lower extremities.  Reflexes are symmetric and 2+ in upper extremities and lower extremities.  Ankle jerks absent.  Sensory exam is grossly intact to light touch, pin prick and vibration with decreased sensation to the midshin level.  Finger to nose and heel to shin is without dysmetria.  Romberg is negative.  Gait is normal and the patient is able to do tandem walk and walk on toes and heels with some difficulty. Mild vocal tremor.    DIAGNOSTICS  MRI  IMPRESSION:  1.  Severe spinal canal stenosis at L4-L5 and L3-L4.  2.  Mild spinal canal stenosis at L2-L3 and L1-L2.  3.  Severe right foraminal stenosis at L3-L4 with ganglionic impingement.  4.  Moderate to severe left foraminal stenosis at L4-L5 with exiting nerve root contact/impingement.  5.  Levoconvex degenerative curvature.  6.  Endplate and facet edema consistent with active degenerative disc disease and facet disease and may function as a source of low back pain. Correlate clinically.    EMG 2024  Interpretation:  This is an abnormal EMG.  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  This would be mild to moderate in severity.     RELEVANT LABS  Component      Latest Ref Rng 12/18/2023  4:22 PM  2/19/2024  12:49 PM 3/27/2024  1:11 PM   Sodium      135 - 145 mmol/L 138      Potassium      3.4 - 5.3 mmol/L 4.5      Chloride      98 - 107 mmol/L 103      Carbon Dioxide (CO2)      22 - 29 mmol/L 24      Anion Gap      7 - 15 mmol/L 11      Glucose      70 - 99 mg/dL 119 (H)      Urea Nitrogen      8.0 - 23.0 mg/dL 20.1      Creatinine      0.67 - 1.17 mg/dL 1.20 (H)      GFR Estimate      >60 mL/min/1.73m2 62      Calcium      8.8 - 10.2 mg/dL 9.8      Albumin      3.5 - 5.2 g/dL 4.7      Phosphorus      2.5 - 4.5 mg/dL 3.2      Albumin Fraction      3.7 - 5.1 g/dL   4.8    Alpha 1 Fraction      0.2 - 0.4 g/dL   0.3    Alpha 2 Fraction      0.5 - 0.9 g/dL   0.6    Beta Fraction      0.6 - 1.0 g/dL   0.9    Gamma Fraction      0.7 - 1.6 g/dL   0.8    Monoclonal Peak      <=0.0 g/dL   0.2 (H)    ELP Interpretation:   Small monoclonal protein (0.2 g/dL) seen in the beta fraction, not previously characterized in our laboratory. Recommend serum and urine immunofixation for confirmation and further characterization if not previously performed elsewhere. Pathologic significance requires clinical correlation. Ela Wilson M.D.    Vitamin B12      232 - 1,245 pg/mL  1,033     WILLIAM interpretation      Negative    Negative    TSH      0.30 - 4.20 uIU/mL   1.51    Hemoglobin A1C      0.0 - 5.6 %   5.2    Total Protein Serum for ELP      6.4 - 8.3 g/dL   7.4    Immunofixation ELP   Monoclonal IgG immunoglobulin of kappa light chain type. Monoclonal free immunoglobulin light chain of lambda chain type. Monoclonal antibody therapeutics (e.g. Daratumumab) may appear as monoclonal proteins on serum electrophoresis and immunofixation. Results should be interpreted with caution. Pathologic significance requires clinical correlation. Maximino Villa MD       Legend:  (H) High    OUTSIDE RECORDS  Outside referral notes and chart notes were reviewed and pertinent information has been summarized (in addition to the  HPI):-        IMPRESSION/REPORT/PLAN  Foot pain  Abnormal serum electrophoresis/MGUS  Neuropathy  Family history of neuropathy  Vitamin B6 use  Lumbar spinal stenosis    This is a 78 year old male with numbness and pain in his feet.  His exam is suggestive of a neuropathy.  EMG is also confirmed diagnosis of neuropathy.  There is family history of neuropathy and his neuropathy could be related to genetic causes.  Other possibility would be idiopathic neuropathy.  He does have a positive serum protein electrophoresis and has been diagnosed with MGUS which could also be a cause for his neuropathy.  Furthermore he does take B6 supplements and B6 toxicity/B6 use has been associated with neuropathy.  Would like to check a B6 level and if that is normal would recommend stopping the B6 to see if his neuropathy improves.  Will check blood work to look for other causes of neuropathy.    His pain does not seem consistent with neuropathy since it is not pins-and-needles or burning in nature.  Is worse with activity and generally neuropathic pain is worse with rest.  Will refer him to podiatry to rule out other causes for his pain.  Can continue gabapentin and Tylenol which is helping.    Continue follow-up with hematology/oncology for MGUS.    I can see him back in about 6 months.  Discussed problems of neuropathy.  Recommend exercise and healthy lifestyle.    -     Orthopedic  Referral; Future  -     Vitamin B6; Future  -     Lyme Disease Total Abs Bld with Reflex to Confirm CLIA; Future  -     Copper level; Future  -     Vitamin B1 whole blood; Future  - Consideration to stop vitamin B6 supplementation.  - Continue follow-up with hematology/oncology for MGUS.    Return in about 6 months (around 10/11/2024) for In-Clinic Visit (must), After testing.    Over 60 minutes were spent coordinating the care for the patient on the day of the encounter.  This includes previsit, during visit and post visit activities as  documented above.  Counseling patient.  Reviewing chart.  Multiple test reviewed.  Testing ordered.  (Activities include but not inclusive of reviewing chart, reviewing outside records, reviewing labs and imaging study results as well as the images, patient visit time including getting history and exam,  use if applicable, review of test results with the patient and coming up with a plan in a shared model, counseling patient and family, education and answering patient questions, EMR , EMR diagnosis entry and problem list management, medication reconciliation and prescription management if applicable, paperwork if applicable, printing documents and documentation of the visit activities.)        Giovanni Martinez MD  Neurologist  [facility]  Tel:- [phone]    This note was dictated using voice recognition software.  Any grammatical or context distortions are unintentional and inherent to the software.

## 2024-04-12 LAB
B BURGDOR IGG+IGM SER QL: 0.06
PROT ELPH PNL UR ELPH: NORMAL

## 2024-04-13 LAB — COPPER SERPL-MCNC: 109.7 UG/DL

## 2024-04-14 LAB — PYRIDOXAL PHOS SERPL-SCNC: 426 NMOL/L

## 2024-04-14 NOTE — TELEPHONE ENCOUNTER
Please let patient know that these tests were ordered in error.  When I placed the order, I was looking at a list of labs that are recommended with a confirmed diagnosis of peripheral neuropathy.  I did not remember and was not looking at his active problem list at the time that showed that he already has been diagnosed with MGUS.  My apologies for this air.  He should continue to follow with [facility] regarding his MGUS.  I did try calling today to discuss with the patient but there was no answer.  I am still happy to talk with him if he has additional questions.

## 2024-04-15 LAB — VIT B1 PYROPHOSHATE BLD-SCNC: 190 NMOL/L

## 2024-04-15 NOTE — TELEPHONE ENCOUNTER
Patient notified of provider's message as written. Patient verbalized understanding. Request out to clinic manager to void costs of tests ordered in error.     Encouraged patient to call the clinic with further questions/concerns.     Oscar SERRA RN  [facility]

## 2024-04-25 ENCOUNTER — OFFICE VISIT (OUTPATIENT)
Dept: PODIATRY | Facility: CLINIC | Age: 79
End: 2024-04-25
Attending: PSYCHIATRY & NEUROLOGY
Payer: COMMERCIAL

## 2024-04-25 VITALS — OXYGEN SATURATION: 95 % | HEART RATE: 74 BPM | BODY MASS INDEX: 25.76 KG/M2 | WEIGHT: 170 LBS | HEIGHT: 68 IN

## 2024-04-25 DIAGNOSIS — M21.6X9 ACQUIRED CAVUS FOOT DEFORMITY: Primary | ICD-10-CM

## 2024-04-25 PROCEDURE — 99203 OFFICE O/P NEW LOW 30 MIN: CPT | Performed by: PODIATRIST

## 2024-04-25 ASSESSMENT — PAIN SCALES - GENERAL: PAINLEVEL: SEVERE PAIN (6)

## 2024-04-25 NOTE — PROGRESS NOTES
"FOOT AND ANKLE SURGERY/PODIATRY CONSULT NOTE         ASSESSMENT:   Cavus foot deformity      TREATMENT:  I have recommended orthotics.  After wearing his orthotics consistently for 2 to 3 months if his pain persist I recommended the patient return to clinic for follow-up visit.        HPI: I was asked to see Crispin Farr today to evaluate and treat bilateral foot pain.  The patient indicated that he has been suffering with this pain for the past year.  The pain is an aching type pain which is aggravated with weightbearing and ambulation.  The pain is located on the bottom of both heels and the ball of both feet.  He stated that after long periods of rest when he takes his first few steps the pain is more magnified.  After walking for short period of time the pain mildly diminishes.  He does not recall any direct trauma to his feet.  He has noticed some swelling but no redness.  He has no pain while resting.  He denies any other previous treatment.  The patient was seen in consultation at the request of Diana Bess MD for evaluation and treatment of bilateral foot pain.     Past Medical History:   Diagnosis Date    Anemia     mild    Chronic low back pain     Fingernail abnormalities     \"changes in fingernails\"     Hypertension     Inability to maintain erection     Lumbar spine scoliosis        Social History     Socioeconomic History    Marital status:      Spouse name: Not on file    Number of children: Not on file    Years of education: Not on file    Highest education level: Not on file   Occupational History    Not on file   Tobacco Use    Smoking status: Former    Smokeless tobacco: Never   Vaping Use    Vaping status: Never Used   Substance and Sexual Activity    Alcohol use: Not Currently     Alcohol/week: 3.0 standard drinks of alcohol    Drug use: Never    Sexual activity: Yes     Partners: Female     Birth control/protection: Male Surgical   Other Topics Concern    Parent/sibling w/ CABG, " MI or angioplasty before 65F 55M? No   Social History Narrative    Not on file     Social Determinants of Health     Financial Resource Strain: Low Risk  (2/18/2024)    Financial Resource Strain     Within the past 12 months, have you or your family members you live with been unable to get utilities (heat, electricity) when it was really needed?: No   Food Insecurity: Low Risk  (2/18/2024)    Food Insecurity     Within the past 12 months, did you worry that your food would run out before you got money to buy more?: No     Within the past 12 months, did the food you bought just not last and you didn t have money to get more?: No   Transportation Needs: Low Risk  (2/18/2024)    Transportation Needs     Within the past 12 months, has lack of transportation kept you from medical appointments, getting your medicines, non-medical meetings or appointments, work, or from getting things that you need?: No   Physical Activity: Sufficiently Active (2/18/2024)    Exercise Vital Sign     Days of Exercise per Week: 3 days     Minutes of Exercise per Session: 60 min   Stress: No Stress Concern Present (2/18/2024)    [employer] [place] [employer] - Occupational Stress Questionnaire     Feeling of Stress : Not at all   Social Connections: Unknown (2/18/2024)    Social Connection and Isolation Panel [NHANES]     Frequency of Communication with Friends and Family: Not on file     Frequency of Social Gatherings with Friends and Family: Twice a week     Attends Yazdanism Services: Not on file     Active Member of Clubs or Organizations: Not on file     Attends Club or Organization Meetings: Not on file     Marital Status: Not on file   Interpersonal Safety: Low Risk  (2/19/2024)    Interpersonal Safety     Do you feel physically and emotionally safe where you currently live?: Yes     Within the past 12 months, have you been hit, slapped, kicked or otherwise physically hurt by someone?: No     Within the past 12 months, have  you been humiliated or emotionally abused in other ways by your partner or ex-partner?: No   Housing Stability: Low Risk  (2/18/2024)    Housing Stability     Do you have housing? : Yes     Are you worried about losing your housing?: No        No Known Allergies       Current Outpatient Medications:     amLODIPine (NORVASC) 10 MG tablet, Take 1 tablet (10 mg) by mouth daily, Disp: 90 tablet, Rfl: 3    cyanocobalamin (VITAMIN B-12) 100 MCG tablet, Take 100 mcg by mouth daily, Disp: , Rfl:     folic acid (FOLVITE) 1 MG tablet, Take 1 tablet by mouth, Disp: , Rfl:     gabapentin (NEURONTIN) 300 MG capsule, Take 2 capsules (600 mg) by mouth 3 times daily, Disp: 360 capsule, Rfl: 3    losartan (COZAAR) 50 MG tablet, Take 1 tablet (50 mg) by mouth daily, Disp: 90 tablet, Rfl: 3    Omega-3 Fatty Acids (OMEGA-3 FISH OIL PO), Take 1,200 mg by mouth daily , Disp: , Rfl:     rosuvastatin (CRESTOR) 10 MG tablet, Take 1 tablet (10 mg) by mouth daily, Disp: 90 tablet, Rfl: 1    sildenafil (REVATIO) 20 MG tablet, Take 3-5 tablets PO 1 hour prior to intercourse Strength: 20 mg, Disp: 40 tablet, Rfl: 4    sodium bicarbonate 325 MG tablet, Take 1 tablet (325 mg) by mouth 2 times daily, Disp: 180 tablet, Rfl: 3    VITAMIN D, CHOLECALCIFEROL, PO, Take 2,000 Units by mouth daily, Disp: , Rfl:     pyridOXINE (VITAMIN B6) 100 MG TABS, , Disp: , Rfl:     vitamin B complex with vitamin C (STRESS TAB) tablet, Take 2 tablets by mouth daily, Disp: , Rfl:      Family History   Problem Relation Age of Onset    Coronary Artery Disease Father     Colon Cancer Cousin     Depression Brother         Social History     Socioeconomic History    Marital status:      Spouse name: Not on file    Number of children: Not on file    Years of education: Not on file    Highest education level: Not on file   Occupational History    Not on file   Tobacco Use    Smoking status: Former    Smokeless tobacco: Never   Vaping Use    Vaping status: Never Used    Substance and Sexual Activity    Alcohol use: Not Currently     Alcohol/week: 3.0 standard drinks of alcohol    Drug use: Never    Sexual activity: Yes     Partners: Female     Birth control/protection: Male Surgical   Other Topics Concern    Parent/sibling w/ CABG, MI or angioplasty before 65F 55M? No   Social History Narrative    Not on file     Social Determinants of Health     Financial Resource Strain: Low Risk  (2/18/2024)    Financial Resource Strain     Within the past 12 months, have you or your family members you live with been unable to get utilities (heat, electricity) when it was really needed?: No   Food Insecurity: Low Risk  (2/18/2024)    Food Insecurity     Within the past 12 months, did you worry that your food would run out before you got money to buy more?: No     Within the past 12 months, did the food you bought just not last and you didn t have money to get more?: No   Transportation Needs: Low Risk  (2/18/2024)    Transportation Needs     Within the past 12 months, has lack of transportation kept you from medical appointments, getting your medicines, non-medical meetings or appointments, work, or from getting things that you need?: No   Physical Activity: Sufficiently Active (2/18/2024)    Exercise Vital Sign     Days of Exercise per Week: 3 days     Minutes of Exercise per Session: 60 min   Stress: No Stress Concern Present (2/18/2024)    [place] [employer] - Occupational Stress Questionnaire     Feeling of Stress : Not at all   Social Connections: Unknown (2/18/2024)    Social Connection and Isolation Panel [NHANES]     Frequency of Communication with Friends and Family: Not on file     Frequency of Social Gatherings with Friends and Family: Twice a week     Attends Spiritism Services: Not on file     Active Member of Clubs or Organizations: Not on file     Attends Club or Organization Meetings: Not on file     Marital Status: Not on file   Interpersonal Safety: Low  "Risk  (2/19/2024)    Interpersonal Safety     Do you feel physically and emotionally safe where you currently live?: Yes     Within the past 12 months, have you been hit, slapped, kicked or otherwise physically hurt by someone?: No     Within the past 12 months, have you been humiliated or emotionally abused in other ways by your partner or ex-partner?: No   Housing Stability: Low Risk  (2/18/2024)    Housing Stability     Do you have housing? : Yes     Are you worried about losing your housing?: No        Review of Systems - Patient denies fever, chills, rash, wound, stiffness,  numbness, weakness, heart burn, blood in stool, chest pain with activity, calf pain when walking, shortness of breath with activity, chronic cough, easy bleeding/bruising, swelling of ankles, excessive thirst, fatigue, depression, anxiety.  Patient admits to bilateral foot pain.      OBJECTIVE:  Appearance: alert, well appearing, and in no distress.    Pulse 74   Ht 1.727 m (5' 8\")   Wt 77.1 kg (170 lb)   SpO2 95%   BMI 25.85 kg/m       Body mass index is 25.85 kg/m .     General appearance: Patient is alert and fully cooperative with history & exam.  No sign of distress is noted during the visit.  Psychiatric: Affect is pleasant & appropriate.  Patient appears motivated to improve health.  Respiratory: Breathing is regular & unlabored while sitting.  HEENT: Hearing is intact to spoken word.  Speech is clear.  No gross evidence of visual impairment that would impact ambulation.    Vascular: Dorsalis pedis and posterior tibial pulses are palpable. There is no pedal hair growth bilaterally.  CFT < 3 sec from anterior tibial surface to distal digits bilaterally. There is no appreciable edema noted.  Dermatologic: Turgor and texture are within normal limits. No coloration or temperature changes. No primary or secondary lesions noted.  Neurologic: All epicritic and proprioceptive sensations are grossly intact bilaterally.  There is a " negative Tinel's sign when percussing the common, superficial and deep peroneal nerves and tarsal tunnel bilaterally.  Musculoskeletal: All active and passive ankle, subtalar, midtarsal, and 1st MPJ range of motion are grossly intact without pain or crepitus. Manual muscle strength is within normal limits bilaterally. All dorsiflexors, plantarflexors, invertors, evertors are intact bilaterally.  No tenderness present to bilateral feet or ankles on palpation.  No tenderness to bilateral feet or ankles with range of motion.  Increased height of the medial longitudinal arch noted bilaterally.  Calf is soft/non-tender with/without warmth/induration    Imaging:       No images are attached to the encounter or orders placed in the encounter.     No results found.   No results found.     Jesse Clement DPM  [facility] Foot & Ankle Surgery/Podiatry

## 2024-04-25 NOTE — Clinical Note
"    4/25/2024         RE: Crispin Farr  [address]  [facility] [id number]        Dear Colleague,    Thank you for referring your patient, Crispin Farr, to the [facility]. Please see a copy of my visit note below.    FOOT AND ANKLE SURGERY/PODIATRY CONSULT NOTE         ASSESSMENT:   Cavus foot deformity      TREATMENT:  I have recommended orthotics.  After wearing his orthotics consistently for 2 to 3 months if his pain persist I recommended the patient return to clinic for follow-up visit.        HPI: I was asked to see Crispin Farr today to evaluate and treat bilateral foot pain.  The patient indicated that he has been suffering with this pain for the past year.  The pain is an aching type pain which is aggravated with weightbearing and ambulation.  The pain is located on the bottom of both heels and the ball of both feet.  He stated that after long periods of rest when he takes his first few steps the pain is more magnified.  After walking for short period of time the pain mildly diminishes.  He does not recall any direct trauma to his feet.  He has noticed some swelling but no redness.  He has no pain while resting.  He denies any other previous treatment.  The patient was seen in consultation at the request of Diana Bess MD for evaluation and treatment of bilateral foot pain.     Past Medical History:   Diagnosis Date    Anemia     mild    Chronic low back pain     Fingernail abnormalities     \"changes in fingernails\"     Hypertension     Inability to maintain erection     Lumbar spine scoliosis        Social History     Socioeconomic History    Marital status:      Spouse name: Not on file    Number of children: Not on file    Years of education: Not on file    Highest education level: Not on file   Occupational History    Not on file   Tobacco Use    Smoking status: Former    Smokeless tobacco: Never   Vaping Use    Vaping status: Never Used   Substance and " Sexual Activity    Alcohol use: Not Currently     Alcohol/week: 3.0 standard drinks of alcohol    Drug use: Never    Sexual activity: Yes     Partners: Female     Birth control/protection: Male Surgical   Other Topics Concern    Parent/sibling w/ CABG, MI or angioplasty before 65F 55M? No   Social History Narrative    Not on file     Social Determinants of Health     Financial Resource Strain: Low Risk  (2/18/2024)    Financial Resource Strain     Within the past 12 months, have you or your family members you live with been unable to get utilities (heat, electricity) when it was really needed?: No   Food Insecurity: Low Risk  (2/18/2024)    Food Insecurity     Within the past 12 months, did you worry that your food would run out before you got money to buy more?: No     Within the past 12 months, did the food you bought just not last and you didn t have money to get more?: No   Transportation Needs: Low Risk  (2/18/2024)    Transportation Needs     Within the past 12 months, has lack of transportation kept you from medical appointments, getting your medicines, non-medical meetings or appointments, work, or from getting things that you need?: No   Physical Activity: Sufficiently Active (2/18/2024)    Exercise Vital Sign     Days of Exercise per Week: 3 days     Minutes of Exercise per Session: 60 min   Stress: No Stress Concern Present (2/18/2024)    [employer] - Occupational Stress Questionnaire     Feeling of Stress : Not at all   Social Connections: Unknown (2/18/2024)    Social Connection and Isolation Panel [NHANES]     Frequency of Communication with Friends and Family: Not on file     Frequency of Social Gatherings with Friends and Family: Twice a week     Attends Jewish Services: Not on file     Active Member of Clubs or Organizations: Not on file     Attends Club or Organization Meetings: Not on file     Marital Status: Not on file   Interpersonal Safety: Low Risk   (2/19/2024)    Interpersonal Safety     Do you feel physically and emotionally safe where you currently live?: Yes     Within the past 12 months, have you been hit, slapped, kicked or otherwise physically hurt by someone?: No     Within the past 12 months, have you been humiliated or emotionally abused in other ways by your partner or ex-partner?: No   Housing Stability: Low Risk  (2/18/2024)    Housing Stability     Do you have housing? : Yes     Are you worried about losing your housing?: No        No Known Allergies       Current Outpatient Medications:     amLODIPine (NORVASC) 10 MG tablet, Take 1 tablet (10 mg) by mouth daily, Disp: 90 tablet, Rfl: 3    cyanocobalamin (VITAMIN B-12) 100 MCG tablet, Take 100 mcg by mouth daily, Disp: , Rfl:     folic acid (FOLVITE) 1 MG tablet, Take 1 tablet by mouth, Disp: , Rfl:     gabapentin (NEURONTIN) 300 MG capsule, Take 2 capsules (600 mg) by mouth 3 times daily, Disp: 360 capsule, Rfl: 3    losartan (COZAAR) 50 MG tablet, Take 1 tablet (50 mg) by mouth daily, Disp: 90 tablet, Rfl: 3    Omega-3 Fatty Acids (OMEGA-3 FISH OIL PO), Take 1,200 mg by mouth daily , Disp: , Rfl:     rosuvastatin (CRESTOR) 10 MG tablet, Take 1 tablet (10 mg) by mouth daily, Disp: 90 tablet, Rfl: 1    sildenafil (REVATIO) 20 MG tablet, Take 3-5 tablets PO 1 hour prior to intercourse Strength: 20 mg, Disp: 40 tablet, Rfl: 4    sodium bicarbonate 325 MG tablet, Take 1 tablet (325 mg) by mouth 2 times daily, Disp: 180 tablet, Rfl: 3    VITAMIN D, CHOLECALCIFEROL, PO, Take 2,000 Units by mouth daily, Disp: , Rfl:     pyridOXINE (VITAMIN B6) 100 MG TABS, , Disp: , Rfl:     vitamin B complex with vitamin C (STRESS TAB) tablet, Take 2 tablets by mouth daily, Disp: , Rfl:      Family History   Problem Relation Age of Onset    Coronary Artery Disease Father     Colon Cancer Cousin     Depression Brother         Social History     Socioeconomic History    Marital status:      Spouse name: Not on  file    Number of children: Not on file    Years of education: Not on file    Highest education level: Not on file   Occupational History    Not on file   Tobacco Use    Smoking status: Former    Smokeless tobacco: Never   Vaping Use    Vaping status: Never Used   Substance and Sexual Activity    Alcohol use: Not Currently     Alcohol/week: 3.0 standard drinks of alcohol    Drug use: Never    Sexual activity: Yes     Partners: Female     Birth control/protection: Male Surgical   Other Topics Concern    Parent/sibling w/ CABG, MI or angioplasty before 65F 55M? No   Social History Narrative    Not on file     Social Determinants of Health     Financial Resource Strain: Low Risk  (2/18/2024)    Financial Resource Strain     Within the past 12 months, have you or your family members you live with been unable to get utilities (heat, electricity) when it was really needed?: No   Food Insecurity: Low Risk  (2/18/2024)    Food Insecurity     Within the past 12 months, did you worry that your food would run out before you got money to buy more?: No     Within the past 12 months, did the food you bought just not last and you didn t have money to get more?: No   Transportation Needs: Low Risk  (2/18/2024)    Transportation Needs     Within the past 12 months, has lack of transportation kept you from medical appointments, getting your medicines, non-medical meetings or appointments, work, or from getting things that you need?: No   Physical Activity: Sufficiently Active (2/18/2024)    Exercise Vital Sign     Days of Exercise per Week: 3 days     Minutes of Exercise per Session: 60 min   Stress: No Stress Concern Present (2/18/2024)    [employer] - Occupational Stress Questionnaire     Feeling of Stress : Not at all   Social Connections: Unknown (2/18/2024)    Social Connection and Isolation Panel [NHANES]     Frequency of Communication with Friends and Family: Not on file     Frequency of Social  "Gatherings with Friends and Family: Twice a week     Attends Judaism Services: Not on file     Active Member of Clubs or Organizations: Not on file     Attends Club or Organization Meetings: Not on file     Marital Status: Not on file   Interpersonal Safety: Low Risk  (2/19/2024)    Interpersonal Safety     Do you feel physically and emotionally safe where you currently live?: Yes     Within the past 12 months, have you been hit, slapped, kicked or otherwise physically hurt by someone?: No     Within the past 12 months, have you been humiliated or emotionally abused in other ways by your partner or ex-partner?: No   Housing Stability: Low Risk  (2/18/2024)    Housing Stability     Do you have housing? : Yes     Are you worried about losing your housing?: No        Review of Systems - Patient denies fever, chills, rash, wound, stiffness,  numbness, weakness, heart burn, blood in stool, chest pain with activity, calf pain when walking, shortness of breath with activity, chronic cough, easy bleeding/bruising, swelling of ankles, excessive thirst, fatigue, depression, anxiety.  Patient admits to bilateral foot pain.      OBJECTIVE:  Appearance: alert, well appearing, and in no distress.    Pulse 74   Ht 1.727 m (5' 8\")   Wt 77.1 kg (170 lb)   SpO2 95%   BMI 25.85 kg/m       Body mass index is 25.85 kg/m .     General appearance: Patient is alert and fully cooperative with history & exam.  No sign of distress is noted during the visit.  Psychiatric: Affect is pleasant & appropriate.  Patient appears motivated to improve health.  Respiratory: Breathing is regular & unlabored while sitting.  HEENT: Hearing is intact to spoken word.  Speech is clear.  No gross evidence of visual impairment that would impact ambulation.    Vascular: Dorsalis pedis and posterior tibial pulses are palpable. There is no pedal hair growth bilaterally.  CFT < 3 sec from anterior tibial surface to distal digits bilaterally. There is no " appreciable edema noted.  Dermatologic: Turgor and texture are within normal limits. No coloration or temperature changes. No primary or secondary lesions noted.  Neurologic: All epicritic and proprioceptive sensations are grossly intact bilaterally.  There is a negative Tinel's sign when percussing the common, superficial and deep peroneal nerves and tarsal tunnel bilaterally.  Musculoskeletal: All active and passive ankle, subtalar, midtarsal, and 1st MPJ range of motion are grossly intact without pain or crepitus. Manual muscle strength is within normal limits bilaterally. All dorsiflexors, plantarflexors, invertors, evertors are intact bilaterally.  No tenderness present to bilateral feet or ankles on palpation.  No tenderness to bilateral feet or ankles with range of motion.  Increased height of the medial longitudinal arch noted bilaterally.  Calf is soft/non-tender with/without warmth/induration    Imaging:       No images are attached to the encounter or orders placed in the encounter.     No results found.   No results found.     Jesse Clement DPM  [facility] Foot & Ankle Surgery/Podiatry         Again, thank you for allowing me to participate in the care of your patient.        Sincerely,        Jesse Montgomery DPM

## 2024-04-25 NOTE — PATIENT INSTRUCTIONS
What are Prescription Custom Orthotics?  Custom orthotics are specially-made devices designed to support and comfort your feet. Prescription orthotics are crafted for you and no one else. They match the contours of your feet precisely and are designed for the way you move. Orthotics are only manufactured after a podiatrist has conducted a complete evaluation of your feet, ankles, and legs, so the orthotic can accommodate your unique foot structure and pathology.  Prescription orthotics are divided into two categories:  Functional orthotics are designed to control abnormal motion. They may be used to treat foot pain caused by abnormal motion; they can also be used to treat injuries such as shin splints or tendinitis. Functional orthotics are usually crafted of a semi-rigid material such as plastic or graphite.  Accommodative orthotics are softer and meant to provide additional cushioning and support. They can be used to treat diabetic foot ulcers, painful calluses on the bottom of the foot, and other uncomfortable conditions.  Podiatrists use orthotics to treat foot problems such as plantar fasciitis, bursitis, tendinitis, diabetic foot ulcers, and foot, ankle, and heel pain. Clinical research studies have shown that podiatrist-prescribed foot orthotics decrease foot pain and improve function.  Orthotics typically cost more than shoe inserts purchased in a retail store, but the additional cost is usually well worth it. Unlike shoe inserts, orthotics are molded to fit each individual foot, so you can be sure that your orthotics fit and do what they're supposed to do. Prescription orthotics are also made of top-notch materials and last many years when cared for properly. Insurance often helps pay for prescription orthotics.  What are Shoe Inserts?   You've seen them at the grocery store and at the mall. You've probably even seen them on TV and online. Shoe inserts are any kind of non-prescription foot support designed  to be worn inside a shoe. Pre-packaged, mass produced, arch supports are shoe inserts. So are the  custom-made  insoles and foot supports that you can order online or at retail stores. Unless the device has been prescribed by a doctor and crafted for your specific foot, it's a shoe insert, not a custom orthotic device--despite what the ads might say.  Shoe inserts can be very helpful for a variety of foot ailments, including flat arches and foot and leg pain. They can cushion your feet, provide comfort, and support your arches, but they can't correct biomechanical foot problems or cure long-standing foot issues.  The most common types of shoe inserts are:  Arch supports: Some people have high arches. Others have low arches or flat feet. Arch supports generally have a  bumped-up  appearance and are designed to support the foot's natural arch.   Insoles: Insoles slip into your shoe to provide extra cushioning and support. Insoles are often made of gel, foam, or plastic.   Heel liners: Heel liners, sometimes called heel pads or heel cups, provide extra cushioning in the heel region. They may be especially useful for patients who have foot pain caused by age-related thinning of the heels' natural fat pads.   Foot cushions: Do your shoes rub against your heel or your toes? Foot cushions come in many different shapes and sizes and can be used as a barrier between you and your shoe.  Choosing an Over-the-Counter Shoe Insert  Selecting a shoe insert from the wide variety of devices on the market can be overwhelming. Here are some podiatrist-tested tips to help you find the insert that best meets your needs:  Consider your health. Do you have diabetes? Problems with circulation? An over-the-counter insert may not be your best bet. Diabetes and poor circulation increase your risk of foot ulcers and infections, so schedule an appointment with a podiatrist. He or she can help you select a solution that won't cause additional  health problems.   Think about the purpose. Are you planning to run a marathon, or do you just need a little arch support in your work shoes? Look for a product that fits your planned level of activity.   Bring your shoes. For the insert to be effective, it has to fit into your shoes. So bring your sneakers, dress shoes, or work boots--whatever you plan to wear with your insert. Look for an insert that will fit the contours of your shoe.   Try them on. If all possible, slip the insert into your shoe and try it out. Walk around a little. How does it feel? Don't assume that feelings of pressure will go away with continued wear. (If you can't try the inserts at the store, ask about the store's return policy and hold on to your receipt.)    Please call one of the [facility] locations below to schedule an appointment. If you received a prescription please bring it with you to your appointment. Some locations are limited to what they carry.    Office Locations    [facility]  [address] [place] [address]  Home Medical Equipment, [address]   Phone: [phone]   Orthotics and Prosthetics, [address]   Phone: [phone]    [facility]   Home Medical Equipment   [address] [address]  Phone :[phone]  Orthotics and Prosthetics   [address], [facility] [address]  Phone:[phone]          [facility]  [address]  [address]   [place] [address]   Phone: [phone]    [facility] Professional [facility]ldg[facility]  [address]  [place] [address]  Phone: [phone]    [facility]  [address]  [place] [address]  Phone: [phone]  Fax: [phone]    [facility]   [address]  [place] [address]  Phone:  [phone]    [facility]  [address]  [place] [address]  Phone: [phone]    [facility]  [address]  [place] [address]  Phone: [phone]    [place]   [address] [place] [address]   Phone: [phone]    WEARING YOUR CUSTOM FOOT ORTHOTICS   Most insurance plans cover one pair of orthotics per year. You must check with your   insurance plan to see what your payment responsibility will be. Please call your   insurance company by calling the number on the back of your insurance card.   Orthotic's are non-refundable and non-returnable.   Orthotics are made of various designs. Some orthotics are covered with material that extends beyond your toes. If your orthotic is of this design, you will likely need to trim the toe end to get a proper fit. The insole from your shoe can be used as a template. Simply overlay the shoe insert on top of the custom orthotic. Align the heel end while tracing the length of the insert onto the custom orthotic. Use a large scissor to trim the toe end until you get a proper fit in the shoe.   The orthotic needs to be pushed as far back in the shoe as possible. The heel portion should not ride forward so as not to irritate your heel.   Orthotics are designed to work with socks. Excessive perspiration will shorten the life span of the orthotics. Remove the orthotic from the shoe frequently for proper drying.   The break-in period lasts for weeks. People new to orthotics will likely experience new aches and pains. The orthotic is forcing your foot into a new position. Arch, foot and leg muscle aches and fatigue are common during these weeks. Minor discomfort can be considered normal break in phenomenon. Start wearing your orthotic around your home your first day. Limited activity for one to two hours is recommended. You can increase one or two additional hours each  day provided the aches and pains are subsiding. The degree of discomfort, fatigue and problems will dictate the speed of break in. You may require multiple weeks to work up to full time use.   Do not continue wearing your orthotics if they are creating problems such as blisters or sores. Do not hesitate to call the clinic to speak with a nurse regarding orthotic   break in, fit, trimming, etc. You may also need to see the doctor if the orthotics are   simply not working out. Adjustments are sometimes made to improve orthotic   function.     Orthotics will only work in certain styles and types of shoes. Orthotics rarely work in dress shoes. Slip-ons, clogs, sandals and heels are particularly troublesome. Specially designed orthotics may be necessary for these types of shoes. Your custom orthotic was designed for activities that require appropriate walking or running shoes. Lace up athletic shoes, walking shoes or work boots should work appropriately. You may need a wider or longer shoe. Shoes with a removable  or insert work best. In general, you want to remove an insert from the shoe before placing the orthotic into the shoe. Shoes without a removable liner may not work as well.     When purchasing new shoes, bring your orthotics along to get a proper fit. Shop at stores that are familiar with orthotics.   Frequent washing of the orthotic may shorten the life span of the top cover. The top cover can be replaced but will generally last one to five years depending on use and foot perspiration.

## 2024-05-03 DIAGNOSIS — M21.6X9 ACQUIRED CAVUS FOOT DEFORMITY: Primary | ICD-10-CM

## 2024-07-29 DIAGNOSIS — N18.31 STAGE 3A CHRONIC KIDNEY DISEASE (H): Primary | ICD-10-CM

## 2024-08-15 ENCOUNTER — DOCUMENTATION ONLY (OUTPATIENT)
Dept: LAB | Facility: CLINIC | Age: 79
End: 2024-08-15
Payer: COMMERCIAL

## 2024-08-15 NOTE — PROGRESS NOTES
Left message to call back for: Don  Information to relay to patient: Please ask patient question below from vinayak Butterfield:  Vinayak Butterfield, SHARON  [facility]1 hour ago (1:51 PM)     Please let patient know that in review of his chart he had lipids and ALT completed in March 2024.  His labs looked excellent at that time.  I do not see that Dr. Bess recommended a repeat lipids at this time.  Please clarify with patient.

## 2024-08-19 ENCOUNTER — LAB (OUTPATIENT)
Dept: LAB | Facility: CLINIC | Age: 79
End: 2024-08-19
Payer: COMMERCIAL

## 2024-08-19 DIAGNOSIS — E78.5 HYPERLIPIDEMIA, UNSPECIFIED HYPERLIPIDEMIA TYPE: Primary | ICD-10-CM

## 2024-08-19 DIAGNOSIS — E78.5 HYPERLIPIDEMIA, UNSPECIFIED HYPERLIPIDEMIA TYPE: ICD-10-CM

## 2024-08-19 DIAGNOSIS — N18.31 STAGE 3A CHRONIC KIDNEY DISEASE (H): ICD-10-CM

## 2024-08-19 LAB
ALBUMIN MFR UR ELPH: 8.4 MG/DL
ALBUMIN SERPL BCG-MCNC: 4.5 G/DL (ref 3.5–5.2)
ALT SERPL W P-5'-P-CCNC: 26 U/L (ref 0–70)
ANION GAP SERPL CALCULATED.3IONS-SCNC: 11 MMOL/L (ref 7–15)
BUN SERPL-MCNC: 23.5 MG/DL (ref 8–23)
CALCIUM SERPL-MCNC: 9.5 MG/DL (ref 8.8–10.4)
CHLORIDE SERPL-SCNC: 105 MMOL/L (ref 98–107)
CHOLEST SERPL-MCNC: 139 MG/DL
CREAT SERPL-MCNC: 1.3 MG/DL (ref 0.67–1.17)
CREAT UR-MCNC: 116 MG/DL
CREAT UR-MCNC: 116 MG/DL
CYSTATIN C (ROCHE): 1.4 MG/L (ref 0.6–1)
EGFRCR SERPLBLD CKD-EPI 2021: 56 ML/MIN/1.73M2
ERYTHROCYTE [DISTWIDTH] IN BLOOD BY AUTOMATED COUNT: 13.5 % (ref 10–15)
GFR/BSA.PRED SERPLBLD CYS-BASED-ARV: 46 ML/MIN/1.73M2
GLUCOSE SERPL-MCNC: 87 MG/DL (ref 70–99)
HCO3 SERPL-SCNC: 23 MMOL/L (ref 22–29)
HCT VFR BLD AUTO: 37 % (ref 40–53)
HDLC SERPL-MCNC: 51 MG/DL
HGB BLD-MCNC: 12.2 G/DL (ref 13.3–17.7)
LDLC SERPL CALC-MCNC: 74 MG/DL
MCH RBC QN AUTO: 31.9 PG (ref 26.5–33)
MCHC RBC AUTO-ENTMCNC: 33 G/DL (ref 31.5–36.5)
MCV RBC AUTO: 97 FL (ref 78–100)
MICROALBUMIN UR-MCNC: <12 MG/L
MICROALBUMIN/CREAT UR: NORMAL MG/G{CREAT}
NONHDLC SERPL-MCNC: 88 MG/DL
PHOSPHATE SERPL-MCNC: 2.8 MG/DL (ref 2.5–4.5)
PLATELET # BLD AUTO: 318 10E3/UL (ref 150–450)
POTASSIUM SERPL-SCNC: 4.3 MMOL/L (ref 3.4–5.3)
PROT/CREAT 24H UR: 0.07 MG/MG CR (ref 0–0.2)
PTH-INTACT SERPL-MCNC: 42 PG/ML (ref 15–65)
RBC # BLD AUTO: 3.82 10E6/UL (ref 4.4–5.9)
SODIUM SERPL-SCNC: 139 MMOL/L (ref 135–145)
TRIGL SERPL-MCNC: 68 MG/DL
WBC # BLD AUTO: 5.7 10E3/UL (ref 4–11)

## 2024-08-19 PROCEDURE — 84460 ALANINE AMINO (ALT) (SGPT): CPT

## 2024-08-19 PROCEDURE — 80069 RENAL FUNCTION PANEL: CPT

## 2024-08-19 PROCEDURE — 82043 UR ALBUMIN QUANTITATIVE: CPT

## 2024-08-19 PROCEDURE — 82570 ASSAY OF URINE CREATININE: CPT

## 2024-08-19 PROCEDURE — 80061 LIPID PANEL: CPT

## 2024-08-19 PROCEDURE — 84156 ASSAY OF PROTEIN URINE: CPT

## 2024-08-19 PROCEDURE — 83970 ASSAY OF PARATHORMONE: CPT

## 2024-08-19 PROCEDURE — 36415 COLL VENOUS BLD VENIPUNCTURE: CPT

## 2024-08-19 PROCEDURE — 85027 COMPLETE CBC AUTOMATED: CPT

## 2024-08-19 PROCEDURE — 82610 CYSTATIN C: CPT

## 2024-09-19 ENCOUNTER — IMMUNIZATION (OUTPATIENT)
Dept: FAMILY MEDICINE | Facility: CLINIC | Age: 79
End: 2024-09-19
Payer: COMMERCIAL

## 2024-09-19 PROCEDURE — 90662 IIV NO PRSV INCREASED AG IM: CPT

## 2024-09-19 PROCEDURE — G0008 ADMIN INFLUENZA VIRUS VAC: HCPCS

## 2024-10-03 ENCOUNTER — IMMUNIZATION (OUTPATIENT)
Dept: FAMILY MEDICINE | Facility: CLINIC | Age: 79
End: 2024-10-03
Payer: COMMERCIAL

## 2024-10-03 DIAGNOSIS — Z23 ENCOUNTER FOR IMMUNIZATION: Primary | ICD-10-CM

## 2024-10-03 PROCEDURE — 99207 PR NO CHARGE NURSE ONLY: CPT

## 2024-10-03 PROCEDURE — 91320 SARSCV2 VAC 30MCG TRS-SUC IM: CPT

## 2024-10-03 PROCEDURE — 90480 ADMN SARSCOV2 VAC 1/ONLY CMP: CPT

## 2024-10-28 DIAGNOSIS — E78.5 HYPERLIPIDEMIA, UNSPECIFIED HYPERLIPIDEMIA TYPE: ICD-10-CM

## 2024-10-28 RX ORDER — ROSUVASTATIN CALCIUM 10 MG/1
10 TABLET, COATED ORAL DAILY
Qty: 90 TABLET | Refills: 0 | Status: SHIPPED | OUTPATIENT
Start: 2024-10-28

## 2025-01-15 ENCOUNTER — OFFICE VISIT (OUTPATIENT)
Dept: NEUROLOGY | Facility: CLINIC | Age: 80
End: 2025-01-15
Payer: COMMERCIAL

## 2025-01-15 VITALS
WEIGHT: 162 LBS | HEIGHT: 68 IN | HEART RATE: 79 BPM | DIASTOLIC BLOOD PRESSURE: 56 MMHG | SYSTOLIC BLOOD PRESSURE: 103 MMHG | BODY MASS INDEX: 24.55 KG/M2

## 2025-01-15 DIAGNOSIS — D47.2 MONOCLONAL PARAPROTEINEMIA: Primary | ICD-10-CM

## 2025-01-15 DIAGNOSIS — Z82.0 FAMILY HISTORY OF NEUROPATHY: ICD-10-CM

## 2025-01-15 DIAGNOSIS — E53.1 VITAMIN B6 DEFICIENCY NEUROPATHY: ICD-10-CM

## 2025-01-15 DIAGNOSIS — G63 VITAMIN B6 DEFICIENCY NEUROPATHY: ICD-10-CM

## 2025-01-15 DIAGNOSIS — G62.9 NEUROPATHY: ICD-10-CM

## 2025-01-15 DIAGNOSIS — M48.061 SPINAL STENOSIS OF LUMBAR REGION WITHOUT NEUROGENIC CLAUDICATION: ICD-10-CM

## 2025-01-15 NOTE — PATIENT INSTRUCTIONS
Instructions for Blood Draw    Hours:   [facility]: M-F 7am-5pm, Saturday- 9am-1pm No appointment is needed.  [facility]: M-F 7am-5pm, Saturday & - 9am-12pm No appointment is needed.  Schedule Lab Appointments through [employer] or by [phone] ([phone])    [facility] (2nd floor) or [facility] ([facility])     For Vitamin Blood Draws:  - Nothing to eat or drink within 12 hours of having your blood drawn (fasting).    - The morning of your blood draw, you can drink water if you take morning medications.

## 2025-01-15 NOTE — LETTER
1/15/2025      Crispin Farr  [address]  [facility] [address]      Dear Colleague,    Thank you for referring your patient, Crispin Farr, to the [facility] [place]. Please see a copy of my visit note below.    NEUROLOGY OUTPATIENT PROGRESS NOTE   Samuel 15, 2025     CHIEF COMPLAINT/REASON FOR VISIT/REASON FOR CONSULT  Patient presents with:  NEUROPATHY: 6 month follow up- no new concerns. Patient reports sx have improved (less pain in feet)     REASON FOR CONSULTATION-foot pain/numbness.    REFERRAL SOURCE  Dr. Diana Bess  CC Dr. Diana Bess    HISTORY OF PRESENT ILLNESS  Crispin Farr is a 79 year old male seen today for evaluation of foot pain/numbness.  Symptoms started about June 2022.  He describes numbness and tingling in his toes and the balls of his feet.  Does have deep pain in his heels.  This is worse with standing and putting pressure on the foot.  Touching the skin does not make the pain worse.  Denies any pins-and-needles or burning pain.  He was carrying a lot of stuff and walking on hard surfaces in June when the symptoms came on.  Does not have any history of diabetes.  He has been taking vitamin B6 supplements for several years.  Blood work done through primary care has shown abnormal serum for electrophoresis.  Multiple family members in his family have neuropathy.  Reports no weight gain.  Have chronic back pain with no worsening.  No shooting pain down the legs.  No weakness or balance issues.  No back surgeries in the past.  For his pain he is on gabapentin and Tylenol which is helping.    Does have occasional positional numbness of his right hand.  No other numbness in the hands.  No major neck pain.    1/15/25  Patient returns today.  Overall neuropathy symptoms have been improving.  He was found to have really high vitamin B6 and has stopped the B6 supplement and his multivitamin and that has significantly helped.  The pain in the feet is  "resolved as well  2.  He was seen by podiatry and diagnosed to have plantar fasciitis.  He has been using Dr. Chirinos's inserts which has been helping.  Complain of back pain.  He wants to do physical therapy.  Wants to hold off on surgery.  No other new concerns.    Previous history is reviewed and this is unchanged.    PAST MEDICAL/SURGICAL HISTORY  Past Medical History:   Diagnosis Date     Anemia     mild     Chronic low back pain      Fingernail abnormalities     \"changes in fingernails\"      Hypertension      Inability to maintain erection      Lumbar spine scoliosis      Patient Active Problem List   Diagnosis     Degeneration of intervertebral disc of lumbar region     Essential hypertension     Stage 3a chronic kidney disease (H)     Family history of colon cancer     Erectile dysfunction, unspecified erectile dysfunction type     MGUS (monoclonal gammopathy of unknown significance)   Significant high cholesterol, high blood pressure    FAMILY HISTORY  Family History   Problem Relation Age of Onset     Coronary Artery Disease Father      Colon Cancer Cousin      Depression Brother    Significant stroke, heart attack, high cholesterol, high blood pressure, tremors, memory loss, vision loss.  His mother and sisters might have neuropathy.    SOCIAL HISTORY  Social History     Tobacco Use     Smoking status: Former     Smokeless tobacco: Never   Vaping Use     Vaping status: Never Used   Substance Use Topics     Alcohol use: Not Currently     Alcohol/week: 3.0 standard drinks of alcohol     Drug use: Never       SYSTEMS REVIEW  Twelve-system ROS was done and other than the HPI this was negative/positive for back pain, joint pain, numbness/tingling, movement disorder with right hand shaking on the computer, bladder symptoms, impotence.    MEDICATIONS  Current Outpatient Medications   Medication Sig Dispense Refill     amLODIPine (NORVASC) 10 MG tablet Take 1 tablet (10 mg) by mouth daily 90 tablet 3     " "cyanocobalamin (VITAMIN B-12) 100 MCG tablet Take 100 mcg by mouth daily       folic acid (FOLVITE) 1 MG tablet Take 1 tablet by mouth       gabapentin (NEURONTIN) 300 MG capsule Take 2 capsules (600 mg) by mouth 3 times daily 360 capsule 3     losartan (COZAAR) 50 MG tablet Take 1 tablet (50 mg) by mouth daily 90 tablet 3     Omega-3 Fatty Acids (OMEGA-3 FISH OIL PO) Take 1,200 mg by mouth daily        rosuvastatin (CRESTOR) 10 MG tablet TAKE 1 TABLET BY MOUTH DAILY 90 tablet 0     sildenafil (REVATIO) 20 MG tablet Take 3-5 tablets PO 1 hour prior to intercourse Strength: 20 mg 40 tablet 4     sodium bicarbonate 325 MG tablet Take 1 tablet (325 mg) by mouth 2 times daily 180 tablet 3     VITAMIN D, CHOLECALCIFEROL, PO Take 2,000 Units by mouth daily       No current facility-administered medications for this visit.        PHYSICAL EXAMINATION  VITALS: /56 (BP Location: Right arm, Patient Position: Sitting)   Pulse 79   Ht 1.727 m (5' 8\")   Wt 73.5 kg (162 lb)   BMI 24.63 kg/m    GENERAL: Healthy appearing, alert, no acute distress, normal habitus.  CARDIOVASCULAR: Extremities warm and well perfused. Pulses present.   NEUROLOGICAL:  Patient is awake and oriented to self, place and time.  Attention span is normal.  Memory is grossly intact.  Language is fluent and follows commands appropriately.  Appropriate fund of knowledge. Cranial nerves 2-12 are intact. There is no pronator drift.  Motor exam shows 5/5 strength in all extremities.  Tone is symmetric bilaterally in upper and lower extremities.  Reflexes are symmetric and 2+ in upper extremities and lower extremities.  Ankle jerks absent.  Sensory exam is grossly intact to light touch, pin prick and vibration with decreased sensation to the midshin level.  Finger to nose and heel to shin is without dysmetria.  Romberg is negative.  Gait is normal and the patient is able to do tandem walk and walk on toes and heels with some difficulty. Mild vocal " tremor.  Exam stable.  Vibratory sense was intact in the ankle along with pinprick sensation.  Improvement from last time    DIAGNOSTICS  MRI  IMPRESSION:  1.  Severe spinal canal stenosis at L4-L5 and L3-L4.  2.  Mild spinal canal stenosis at L2-L3 and L1-L2.  3.  Severe right foraminal stenosis at L3-L4 with ganglionic impingement.  4.  Moderate to severe left foraminal stenosis at L4-L5 with exiting nerve root contact/impingement.  5.  Levoconvex degenerative curvature.  6.  Endplate and facet edema consistent with active degenerative disc disease and facet disease and may function as a source of low back pain. Correlate clinically.    EMG 2024  Interpretation:  This is an abnormal EMG.  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  This would be mild to moderate in severity.     RELEVANT LABS  Component      Latest Ref Rng 12/18/2023  4:22 PM 2/19/2024  12:49 PM 3/27/2024  1:11 PM   Sodium      135 - 145 mmol/L 138      Potassium      3.4 - 5.3 mmol/L 4.5      Chloride      98 - 107 mmol/L 103      Carbon Dioxide (CO2)      22 - 29 mmol/L 24      Anion Gap      7 - 15 mmol/L 11      Glucose      70 - 99 mg/dL 119 (H)      Urea Nitrogen      8.0 - 23.0 mg/dL 20.1      Creatinine      0.67 - 1.17 mg/dL 1.20 (H)      GFR Estimate      >60 mL/min/1.73m2 62      Calcium      8.8 - 10.2 mg/dL 9.8      Albumin      3.5 - 5.2 g/dL 4.7      Phosphorus      2.5 - 4.5 mg/dL 3.2      Albumin Fraction      3.7 - 5.1 g/dL   4.8    Alpha 1 Fraction      0.2 - 0.4 g/dL   0.3    Alpha 2 Fraction      0.5 - 0.9 g/dL   0.6    Beta Fraction      0.6 - 1.0 g/dL   0.9    Gamma Fraction      0.7 - 1.6 g/dL   0.8    Monoclonal Peak      <=0.0 g/dL   0.2 (H)    ELP Interpretation:   Small monoclonal protein (0.2 g/dL) seen in the beta fraction, not previously characterized in our laboratory. Recommend serum and urine immunofixation for confirmation and further  characterization if not previously performed elsewhere. Pathologic significance requires clinical correlation. Ela Wilson M.D.    Vitamin B12      232 - 1,245 pg/mL  1,033     WILLIAM interpretation      Negative    Negative    TSH      0.30 - 4.20 uIU/mL   1.51    Hemoglobin A1C      0.0 - 5.6 %   5.2    Total Protein Serum for ELP      6.4 - 8.3 g/dL   7.4    Immunofixation ELP   Monoclonal IgG immunoglobulin of kappa light chain type. Monoclonal free immunoglobulin light chain of lambda chain type. Monoclonal antibody therapeutics (e.g. Daratumumab) may appear as monoclonal proteins on serum electrophoresis and immunofixation. Results should be interpreted with caution. Pathologic significance requires clinical correlation. Maximino Villa MD       Legend:  (H) High    OUTSIDE RECORDS  Outside referral notes and chart notes were reviewed and pertinent information has been summarized (in addition to the HPI):-          LABS  Component      Latest Ref Rng 4/11/2024  11:32 AM 4/11/2024  11:39 AM   Vitamin B1 Whole Blood Level      70 - 180 nmol/L 190 (H)     Copper      70.0 - 140.0 ug/dL 109.7     Lyme Disease Antibodies Serum      <0.90  0.06     Vitamin B6      20.0 - 125.0 nmol/L 426.0 (H)     Immunofix ELP Urine  Monoclonal free immunoglobulin light chain of kappa chain type. Pathologic significance requires clinical correlation. Chiquis Almazan MD       Legend:  (H) High    Podiatry notes reviewed.    IMPRESSION/REPORT/PLAN  Foot pain  Abnormal serum electrophoresis/MGUS  Neuropathy  Family history of neuropathy  Vitamin B6 use  Lumbar spinal stenosis  Elevated vitamin B6 level    This is a 79 year old male with numbness and pain in his feet.  His exam is suggestive of a neuropathy.  EMG is also confirmed diagnosis of neuropathy.  There is family history of neuropathy and his neuropathy could be related to genetic causes.  Other possibility would be idiopathic neuropathy.  He does have a positive serum  protein electrophoresis and has been diagnosed with MGUS which could also be a cause for his neuropathy.  Blood work was otherwise negative.  Vitamin B6 level was elevated as he takes supplements which could be a cause of his neuropathy.  With stopping the supplements his neuropathy has started to improve and there is some improvement on exam as well.  Will recheck vitamin B6 level to see if he can go back on a multivitamin.  Discussed limiting sweets and energy drinks.  Recommend healthy diet.    His pain does not seem consistent with neuropathy since it is not pins-and-needles or burning in nature.  Is worse with activity and generally neuropathic pain is worse with rest.  He has been diagnosed to have plantar fasciitis through podiatry.  Insoles are helping.    Can continue gabapentin and Tylenol which is helping.    He does have back pain.  I put in a referral for physical therapy.  Could consider referral to the spine center if physical therapy does not help.    Continue follow-up with hematology/oncology for MGUS.    I can see him back in about 6 months.  Discussed problems of neuropathy.  Recommend exercise and healthy lifestyle.    -     Physical Therapy  Referral; Future  -     Vitamin B6; Future  - Continue follow-up with hematology/oncology for MGUS.    Return in about 6 months (around 7/15/2025) for In-Clinic Visit (must), After testing.    Over 30 minutes were spent coordinating the care for the patient on the day of the encounter.  This includes previsit, during visit and post visit activities as documented above.  Counseling patient.  Reviewing chart/testing.  Podiatry notes reviewed.  Multiple problems addressed.  (Activities include but not inclusive of reviewing chart, reviewing outside records, reviewing labs and imaging study results as well as the images, patient visit time including getting history and exam,  use if applicable, review of test results with the patient and  coming up with a plan in a shared model, counseling patient and family, education and answering patient questions, EMR , EMR diagnosis entry and problem list management, medication reconciliation and prescription management if applicable, paperwork if applicable, printing documents and documentation of the visit activities.)        Giovanni Martinez MD  Neurologist  [facility]  Tel:- [phone]    This note was dictated using voice recognition software.  Any grammatical or context distortions are unintentional and inherent to the software.    The longitudinal plan of care for the diagnosis(es)/condition(s) as documented were addressed during this visit. Due to the added complexity in care, I will continue to support Don in the subsequent management and with ongoing continuity of care.      Again, thank you for allowing me to participate in the care of your patient.        Sincerely,        Giovanni Martinez MD    Electronically signed

## 2025-01-15 NOTE — PROGRESS NOTES
NEUROLOGY OUTPATIENT PROGRESS NOTE   Samuel 15, 2025     CHIEF COMPLAINT/REASON FOR VISIT/REASON FOR CONSULT  Patient presents with:  NEUROPATHY: 6 month follow up- no new concerns. Patient reports sx have improved (less pain in feet)     REASON FOR CONSULTATION-foot pain/numbness.    REFERRAL SOURCE  Dr. Diana Bess  CC Dr. Diana Bess    HISTORY OF PRESENT ILLNESS  Crispin Farr is a 79 year old male seen today for evaluation of foot pain/numbness.  Symptoms started about June 2022.  He describes numbness and tingling in his toes and the balls of his feet.  Does have deep pain in his heels.  This is worse with standing and putting pressure on the foot.  Touching the skin does not make the pain worse.  Denies any pins-and-needles or burning pain.  He was carrying a lot of stuff and walking on hard surfaces in June when the symptoms came on.  Does not have any history of diabetes.  He has been taking vitamin B6 supplements for several years.  Blood work done through primary care has shown abnormal serum for electrophoresis.  Multiple family members in his family have neuropathy.  Reports no weight gain.  Have chronic back pain with no worsening.  No shooting pain down the legs.  No weakness or balance issues.  No back surgeries in the past.  For his pain he is on gabapentin and Tylenol which is helping.    Does have occasional positional numbness of his right hand.  No other numbness in the hands.  No major neck pain.    1/15/25  Patient returns today.  Overall neuropathy symptoms have been improving.  He was found to have really high vitamin B6 and has stopped the B6 supplement and his multivitamin and that has significantly helped.  The pain in the feet is resolved as well  2.  He was seen by podiatry and diagnosed to have plantar fasciitis.  He has been using Dr. Chirinos's inserts which has been helping.  Complain of back pain.  He wants to do physical therapy.  Wants to hold off on surgery.  No other  "new concerns.    Previous history is reviewed and this is unchanged.    PAST MEDICAL/SURGICAL HISTORY  Past Medical History:   Diagnosis Date    Anemia     mild    Chronic low back pain     Fingernail abnormalities     \"changes in fingernails\"     Hypertension     Inability to maintain erection     Lumbar spine scoliosis      Patient Active Problem List   Diagnosis    Degeneration of intervertebral disc of lumbar region    Essential hypertension    Stage 3a chronic kidney disease (H)    Family history of colon cancer    Erectile dysfunction, unspecified erectile dysfunction type    MGUS (monoclonal gammopathy of unknown significance)   Significant high cholesterol, high blood pressure    FAMILY HISTORY  Family History   Problem Relation Age of Onset    Coronary Artery Disease Father     Colon Cancer Cousin     Depression Brother    Significant stroke, heart attack, high cholesterol, high blood pressure, tremors, memory loss, vision loss.  His mother and sisters might have neuropathy.    SOCIAL HISTORY  Social History     Tobacco Use    Smoking status: Former    Smokeless tobacco: Never   Vaping Use    Vaping status: Never Used   Substance Use Topics    Alcohol use: Not Currently     Alcohol/week: 3.0 standard drinks of alcohol    Drug use: Never       SYSTEMS REVIEW  Twelve-system ROS was done and other than the HPI this was negative/positive for back pain, joint pain, numbness/tingling, movement disorder with right hand shaking on the computer, bladder symptoms, impotence.    MEDICATIONS  Current Outpatient Medications   Medication Sig Dispense Refill    amLODIPine (NORVASC) 10 MG tablet Take 1 tablet (10 mg) by mouth daily 90 tablet 3    cyanocobalamin (VITAMIN B-12) 100 MCG tablet Take 100 mcg by mouth daily      folic acid (FOLVITE) 1 MG tablet Take 1 tablet by mouth      gabapentin (NEURONTIN) 300 MG capsule Take 2 capsules (600 mg) by mouth 3 times daily 360 capsule 3    losartan (COZAAR) 50 MG tablet Take 1 " "tablet (50 mg) by mouth daily 90 tablet 3    Omega-3 Fatty Acids (OMEGA-3 FISH OIL PO) Take 1,200 mg by mouth daily       rosuvastatin (CRESTOR) 10 MG tablet TAKE 1 TABLET BY MOUTH DAILY 90 tablet 0    sildenafil (REVATIO) 20 MG tablet Take 3-5 tablets PO 1 hour prior to intercourse Strength: 20 mg 40 tablet 4    sodium bicarbonate 325 MG tablet Take 1 tablet (325 mg) by mouth 2 times daily 180 tablet 3    VITAMIN D, CHOLECALCIFEROL, PO Take 2,000 Units by mouth daily       No current facility-administered medications for this visit.        PHYSICAL EXAMINATION  VITALS: /56 (BP Location: Right arm, Patient Position: Sitting)   Pulse 79   Ht 1.727 m (5' 8\")   Wt 73.5 kg (162 lb)   BMI 24.63 kg/m    GENERAL: Healthy appearing, alert, no acute distress, normal habitus.  CARDIOVASCULAR: Extremities warm and well perfused. Pulses present.   NEUROLOGICAL:  Patient is awake and oriented to self, place and time.  Attention span is normal.  Memory is grossly intact.  Language is fluent and follows commands appropriately.  Appropriate fund of knowledge. Cranial nerves 2-12 are intact. There is no pronator drift.  Motor exam shows 5/5 strength in all extremities.  Tone is symmetric bilaterally in upper and lower extremities.  Reflexes are symmetric and 2+ in upper extremities and lower extremities.  Ankle jerks absent.  Sensory exam is grossly intact to light touch, pin prick and vibration with decreased sensation to the midshin level.  Finger to nose and heel to shin is without dysmetria.  Romberg is negative.  Gait is normal and the patient is able to do tandem walk and walk on toes and heels with some difficulty. Mild vocal tremor.  Exam stable.  Vibratory sense was intact in the ankle along with pinprick sensation.  Improvement from last time    DIAGNOSTICS  MRI  IMPRESSION:  1.  Severe spinal canal stenosis at L4-L5 and L3-L4.  2.  Mild spinal canal stenosis at L2-L3 and L1-L2.  3.  Severe right foraminal " stenosis at L3-L4 with ganglionic impingement.  4.  Moderate to severe left foraminal stenosis at L4-L5 with exiting nerve root contact/impingement.  5.  Levoconvex degenerative curvature.  6.  Endplate and facet edema consistent with active degenerative disc disease and facet disease and may function as a source of low back pain. Correlate clinically.    EMG 2024  Interpretation:  This is an abnormal EMG.  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  This would be mild to moderate in severity.     RELEVANT LABS  Component      Latest Ref Rng 12/18/2023  4:22 PM 2/19/2024  12:49 PM 3/27/2024  1:11 PM   Sodium      135 - 145 mmol/L 138      Potassium      3.4 - 5.3 mmol/L 4.5      Chloride      98 - 107 mmol/L 103      Carbon Dioxide (CO2)      22 - 29 mmol/L 24      Anion Gap      7 - 15 mmol/L 11      Glucose      70 - 99 mg/dL 119 (H)      Urea Nitrogen      8.0 - 23.0 mg/dL 20.1      Creatinine      0.67 - 1.17 mg/dL 1.20 (H)      GFR Estimate      >60 mL/min/1.73m2 62      Calcium      8.8 - 10.2 mg/dL 9.8      Albumin      3.5 - 5.2 g/dL 4.7      Phosphorus      2.5 - 4.5 mg/dL 3.2      Albumin Fraction      3.7 - 5.1 g/dL   4.8    Alpha 1 Fraction      0.2 - 0.4 g/dL   0.3    Alpha 2 Fraction      0.5 - 0.9 g/dL   0.6    Beta Fraction      0.6 - 1.0 g/dL   0.9    Gamma Fraction      0.7 - 1.6 g/dL   0.8    Monoclonal Peak      <=0.0 g/dL   0.2 (H)    ELP Interpretation:   Small monoclonal protein (0.2 g/dL) seen in the beta fraction, not previously characterized in our laboratory. Recommend serum and urine immunofixation for confirmation and further characterization if not previously performed elsewhere. Pathologic significance requires clinical correlation. Ela Wilson M.D.    Vitamin B12      232 - 1,245 pg/mL  1,033     WILLIAM interpretation      Negative    Negative    TSH      0.30 - 4.20 uIU/mL   1.51    Hemoglobin A1C      0.0  - 5.6 %   5.2    Total Protein Serum for ELP      6.4 - 8.3 g/dL   7.4    Immunofixation ELP   Monoclonal IgG immunoglobulin of kappa light chain type. Monoclonal free immunoglobulin light chain of lambda chain type. Monoclonal antibody therapeutics (e.g. Daratumumab) may appear as monoclonal proteins on serum electrophoresis and immunofixation. Results should be interpreted with caution. Pathologic significance requires clinical correlation. Maximino Villa MD       Legend:  (H) High    OUTSIDE RECORDS  Outside referral notes and chart notes were reviewed and pertinent information has been summarized (in addition to the HPI):-          LABS  Component      Latest Ref Rng 4/11/2024  11:32 AM 4/11/2024  11:39 AM   Vitamin B1 Whole Blood Level      70 - 180 nmol/L 190 (H)     Copper      70.0 - 140.0 ug/dL 109.7     Lyme Disease Antibodies Serum      <0.90  0.06     Vitamin B6      20.0 - 125.0 nmol/L 426.0 (H)     Immunofix ELP Urine  Monoclonal free immunoglobulin light chain of kappa chain type. Pathologic significance requires clinical correlation. Chiquis Almazan MD       Legend:  (H) High    Podiatry notes reviewed.    IMPRESSION/REPORT/PLAN  Foot pain  Abnormal serum electrophoresis/MGUS  Neuropathy  Family history of neuropathy  Vitamin B6 use  Lumbar spinal stenosis  Elevated vitamin B6 level    This is a 79 year old male with numbness and pain in his feet.  His exam is suggestive of a neuropathy.  EMG is also confirmed diagnosis of neuropathy.  There is family history of neuropathy and his neuropathy could be related to genetic causes.  Other possibility would be idiopathic neuropathy.  He does have a positive serum protein electrophoresis and has been diagnosed with MGUS which could also be a cause for his neuropathy.  Blood work was otherwise negative.  Vitamin B6 level was elevated as he takes supplements which could be a cause of his neuropathy.  With stopping the supplements his neuropathy has  started to improve and there is some improvement on exam as well.  Will recheck vitamin B6 level to see if he can go back on a multivitamin.  Discussed limiting sweets and energy drinks.  Recommend healthy diet.    His pain does not seem consistent with neuropathy since it is not pins-and-needles or burning in nature.  Is worse with activity and generally neuropathic pain is worse with rest.  He has been diagnosed to have plantar fasciitis through podiatry.  Insoles are helping.    Can continue gabapentin and Tylenol which is helping.    He does have back pain.  I put in a referral for physical therapy.  Could consider referral to the spine center if physical therapy does not help.    Continue follow-up with hematology/oncology for MGUS.    I can see him back in about 6 months.  Discussed problems of neuropathy.  Recommend exercise and healthy lifestyle.    -     Physical Therapy  Referral; Future  -     Vitamin B6; Future  - Continue follow-up with hematology/oncology for MGUS.    Return in about 6 months (around 7/15/2025) for In-Clinic Visit (must), After testing.    Over 30 minutes were spent coordinating the care for the patient on the day of the encounter.  This includes previsit, during visit and post visit activities as documented above.  Counseling patient.  Reviewing chart/testing.  Podiatry notes reviewed.  Multiple problems addressed.  (Activities include but not inclusive of reviewing chart, reviewing outside records, reviewing labs and imaging study results as well as the images, patient visit time including getting history and exam,  use if applicable, review of test results with the patient and coming up with a plan in a shared model, counseling patient and family, education and answering patient questions, EMR , EMR diagnosis entry and problem list management, medication reconciliation and prescription management if applicable, paperwork if applicable, printing  documents and documentation of the visit activities.)        Giovanni Martinez MD  Neurologist  [facility]  Tel:- [phone]    This note was dictated using voice recognition software.  Any grammatical or context distortions are unintentional and inherent to the software.    The longitudinal plan of care for the diagnosis(es)/condition(s) as documented were addressed during this visit. Due to the added complexity in care, I will continue to support Don in the subsequent management and with ongoing continuity of care.

## 2025-01-15 NOTE — NURSING NOTE
Chief Complaint   Patient presents with    NEUROPATHY     6 month follow up- no new concerns. Patient reports sx have improved (less pain in feet)      Milly CONCEPCION CMA on 1/15/2025 at 9:54 AM  [facility]

## 2025-01-22 ENCOUNTER — LAB (OUTPATIENT)
Dept: LAB | Facility: CLINIC | Age: 80
End: 2025-01-22
Payer: COMMERCIAL

## 2025-01-22 DIAGNOSIS — G63 VITAMIN B6 DEFICIENCY NEUROPATHY: ICD-10-CM

## 2025-01-22 DIAGNOSIS — E53.1 VITAMIN B6 DEFICIENCY NEUROPATHY: ICD-10-CM

## 2025-01-22 PROCEDURE — 84207 ASSAY OF VITAMIN B-6: CPT | Mod: 90

## 2025-01-22 PROCEDURE — 36415 COLL VENOUS BLD VENIPUNCTURE: CPT

## 2025-01-22 PROCEDURE — 99000 SPECIMEN HANDLING OFFICE-LAB: CPT

## 2025-01-26 LAB — PYRIDOXAL PHOS SERPL-SCNC: 146.4 NMOL/L

## 2025-01-28 ENCOUNTER — LAB (OUTPATIENT)
Dept: LAB | Facility: CLINIC | Age: 80
End: 2025-01-28
Payer: COMMERCIAL

## 2025-01-28 DIAGNOSIS — N18.31 CHRONIC KIDNEY DISEASE (CKD) STAGE G3A/A1, MODERATELY DECREASED GLOMERULAR FILTRATION RATE (GFR) BETWEEN 45-59 ML/MIN/1.73 SQUARE METER AND ALBUMINURIA CREATININE RATIO LESS THAN 30 MG/G (H): ICD-10-CM

## 2025-01-28 DIAGNOSIS — N18.31 CHRONIC KIDNEY DISEASE (CKD) STAGE G3A/A1, MODERATELY DECREASED GLOMERULAR FILTRATION RATE (GFR) BETWEEN 45-59 ML/MIN/1.73 SQUARE METER AND ALBUMINURIA CREATININE RATIO LESS THAN 30 MG/G (H): Primary | ICD-10-CM

## 2025-01-28 LAB
ERYTHROCYTE [DISTWIDTH] IN BLOOD BY AUTOMATED COUNT: 13.6 % (ref 10–15)
HCT VFR BLD AUTO: 37.1 % (ref 40–53)
HGB BLD-MCNC: 12.5 G/DL (ref 13.3–17.7)
MCH RBC QN AUTO: 32.6 PG (ref 26.5–33)
MCHC RBC AUTO-ENTMCNC: 33.7 G/DL (ref 31.5–36.5)
MCV RBC AUTO: 97 FL (ref 78–100)
PLATELET # BLD AUTO: 360 10E3/UL (ref 150–450)
RBC # BLD AUTO: 3.84 10E6/UL (ref 4.4–5.9)
WBC # BLD AUTO: 6.6 10E3/UL (ref 4–11)

## 2025-01-28 PROCEDURE — 82306 VITAMIN D 25 HYDROXY: CPT

## 2025-01-28 PROCEDURE — 84156 ASSAY OF PROTEIN URINE: CPT

## 2025-01-28 PROCEDURE — 36415 COLL VENOUS BLD VENIPUNCTURE: CPT

## 2025-01-28 PROCEDURE — 85027 COMPLETE CBC AUTOMATED: CPT

## 2025-01-28 PROCEDURE — 83970 ASSAY OF PARATHORMONE: CPT

## 2025-01-28 PROCEDURE — 83550 IRON BINDING TEST: CPT

## 2025-01-28 PROCEDURE — 83540 ASSAY OF IRON: CPT

## 2025-01-28 PROCEDURE — 80069 RENAL FUNCTION PANEL: CPT

## 2025-01-28 PROCEDURE — 82728 ASSAY OF FERRITIN: CPT

## 2025-01-29 LAB
ALBUMIN MFR UR ELPH: <6 MG/DL
ALBUMIN SERPL BCG-MCNC: 4.6 G/DL (ref 3.5–5.2)
ANION GAP SERPL CALCULATED.3IONS-SCNC: 11 MMOL/L (ref 7–15)
BUN SERPL-MCNC: 31.8 MG/DL (ref 8–23)
CALCIUM SERPL-MCNC: 9.7 MG/DL (ref 8.8–10.4)
CHLORIDE SERPL-SCNC: 104 MMOL/L (ref 98–107)
CREAT SERPL-MCNC: 1.48 MG/DL (ref 0.67–1.17)
CREAT UR-MCNC: 55.1 MG/DL
EGFRCR SERPLBLD CKD-EPI 2021: 48 ML/MIN/1.73M2
FERRITIN SERPL-MCNC: 329 NG/ML (ref 31–409)
GLUCOSE SERPL-MCNC: 88 MG/DL (ref 70–99)
HCO3 SERPL-SCNC: 24 MMOL/L (ref 22–29)
IRON BINDING CAPACITY (ROCHE): 241 UG/DL (ref 240–430)
IRON SATN MFR SERPL: 38 % (ref 15–46)
IRON SERPL-MCNC: 91 UG/DL (ref 61–157)
PHOSPHATE SERPL-MCNC: 3.1 MG/DL (ref 2.5–4.5)
POTASSIUM SERPL-SCNC: 4.2 MMOL/L (ref 3.4–5.3)
PROT/CREAT 24H UR: NORMAL MG/G{CREAT}
PTH-INTACT SERPL-MCNC: 38 PG/ML (ref 15–65)
SODIUM SERPL-SCNC: 139 MMOL/L (ref 135–145)
VIT D+METAB SERPL-MCNC: 48 NG/ML (ref 20–50)

## 2025-02-03 ENCOUNTER — OFFICE VISIT (OUTPATIENT)
Dept: FAMILY MEDICINE | Facility: CLINIC | Age: 80
End: 2025-02-03
Payer: COMMERCIAL

## 2025-02-03 VITALS
DIASTOLIC BLOOD PRESSURE: 71 MMHG | BODY MASS INDEX: 24.55 KG/M2 | HEIGHT: 68 IN | RESPIRATION RATE: 16 BRPM | HEART RATE: 61 BPM | WEIGHT: 162 LBS | TEMPERATURE: 97.8 F | OXYGEN SATURATION: 96 % | SYSTOLIC BLOOD PRESSURE: 116 MMHG

## 2025-02-03 DIAGNOSIS — N18.31 STAGE 3A CHRONIC KIDNEY DISEASE (H): ICD-10-CM

## 2025-02-03 DIAGNOSIS — Z01.818 PREOP GENERAL PHYSICAL EXAM: Primary | ICD-10-CM

## 2025-02-03 DIAGNOSIS — M19.011 ARTHRITIS OF RIGHT GLENOHUMERAL JOINT: ICD-10-CM

## 2025-02-03 DIAGNOSIS — I45.4 INTRAVENTRICULAR CONDUCTION DEFECT: ICD-10-CM

## 2025-02-03 DIAGNOSIS — I10 ESSENTIAL HYPERTENSION: ICD-10-CM

## 2025-02-03 LAB
ATRIAL RATE - MUSE: 56 BPM
DIASTOLIC BLOOD PRESSURE - MUSE: NORMAL MMHG
INTERPRETATION ECG - MUSE: NORMAL
P AXIS - MUSE: 46 DEGREES
PR INTERVAL - MUSE: 180 MS
QRS DURATION - MUSE: 124 MS
QT - MUSE: 430 MS
QTC - MUSE: 414 MS
R AXIS - MUSE: -41 DEGREES
SYSTOLIC BLOOD PRESSURE - MUSE: NORMAL MMHG
T AXIS - MUSE: 19 DEGREES
VENTRICULAR RATE- MUSE: 56 BPM

## 2025-02-03 PROCEDURE — 93005 ELECTROCARDIOGRAM TRACING: CPT | Performed by: FAMILY MEDICINE

## 2025-02-03 PROCEDURE — 99214 OFFICE O/P EST MOD 30 MIN: CPT | Performed by: FAMILY MEDICINE

## 2025-02-03 PROCEDURE — G2211 COMPLEX E/M VISIT ADD ON: HCPCS | Performed by: FAMILY MEDICINE

## 2025-02-03 PROCEDURE — 93010 ELECTROCARDIOGRAM REPORT: CPT | Performed by: GENERAL ACUTE CARE HOSPITAL

## 2025-02-03 RX ORDER — FOLIC ACID 0.4 MG
400 TABLET ORAL DAILY
COMMUNITY

## 2025-02-03 NOTE — PROGRESS NOTES
Preoperative Evaluation  [facility]  [address]  [facility] [address]  Phone: [phone]  Fax: [phone]  Primary Provider: DIEGO CHOWDHURY MD  Pre-op Performing Provider: DIEGO CHOWDHURY MD  Feb 3, 2025             1/29/2025   Surgical Information   What procedure is being done? Right shoulder replacement   Facility or Hospital where procedure/surgery [facility] be performed[facility]   Who is doing the procedure / surgery? Dr. Correa   Date of surgery / procedure: Feb. 20, 2025   Time of surgery / procedure: Unknown   Where do you plan to recover after surgery? at home with family     Fax number for surgical facility: [phone]    Assessment & Plan     The proposed surgical procedure is considered INTERMEDIATE risk.    Assessment & Plan  Preop general physical exam  Recommend an echocardiogram to evaluate for possible structural heart disease due to ventricular conduction delay  Orders:    EKG 12-lead, tracing only    Arthritis of right glenohumeral joint         Stage 3a chronic kidney disease (H)  Stable       Essential hypertension    Orders:    EKG 12-lead, tracing only    Intraventricular conduction defect    Orders:    Echocardiogram Complete; Future      ADDENDUM: Echocardiogram was normal.  Okay to proceed with surgery.        - No identified additional risk factors other than previously addressed    Antiplatelet or Anticoagulation Medication Instructions   - Patient is on no antiplatelet or anticoagulation medications.    Additional Medication Instructions   - ACE/ARB/ARNI (lisinopril, enalapril, losartan, valsartan, olmesartan, sacubritril/valsartan) : DO NOT TAKE on day of surgery (minimum 11 hours for general anesthesia).   - Calcium Channel Blockers (amlodipine, diltiazem, felodipine): May be continued on the day of surgery.   - Statins (atorvastatin, simvastatin, pravastatin) : Continue taking on the day of surgery.    - pregabalin,  "gabapentin: Continue without modification.    Recommendation  Approval given to proceed with proposed procedure, without further diagnostic evaluation.    Clara Gill is a 79 year old, presenting for the following:      HPI related to upcoming procedure: The patient has a long history of right shoulder pain over the past 3-4 years with significant worsening pain and \"grinding\" sounds along with reduced ROM. He already had his left shoulder replaced about 7 years ago successfully.         1/29/2025   Pre-Op Questionnaire   Have you ever had a heart attack or stroke? No   Have you ever had surgery on your heart or blood vessels, such as a stent placement, a coronary artery bypass, or surgery on an artery in your head, neck, heart, or legs? No   Do you have chest pain with activity? No   Do you have a history of heart failure? No   Do you currently have a cold, bronchitis or symptoms of other infection? No   Do you have a cough, shortness of breath, or wheezing? No   Do you or anyone in your family have previous history of blood clots? No   Do you or does anyone in your family have a serious bleeding problem such as prolonged bleeding following surgeries or cuts? No   Have you ever had problems with anemia or been told to take iron pills? No   Have you had any abnormal blood loss such as black, tarry or bloody stools? No   Have you ever had a blood transfusion? No   Are you willing to have a blood transfusion if it is medically needed before, during, or after your surgery? Yes   Have you or any of your relatives ever had problems with anesthesia? No   Do you have sleep apnea, excessive snoring or daytime drowsiness? No   Do you have any artifical heart valves or other implanted medical devices like a pacemaker, defibrillator, or continuous glucose monitor? No   Do you have artificial joints? (!) YES   Are you allergic to latex? No     Health Care Directive  Patient does not have a Health Care Directive: Discussed " "advance care planning with patient; information given to patient to review.    Preoperative Review of    reviewed - no record of controlled substances prescribed.      Status of Chronic Conditions:  See problem list for active medical problems.  Problems all longstanding and stable, except as noted/documented.  See ROS for pertinent symptoms related to these conditions.    Patient Active Problem List    Diagnosis Date Noted    MGUS (monoclonal gammopathy of unknown significance) 02/19/2024     Priority: Medium    Erectile dysfunction, unspecified erectile dysfunction type 01/10/2023     Priority: Medium    Stage 3a chronic kidney disease (H) 01/13/2021     Priority: Medium     Nephrology: Dr. Lozano managing      Degeneration of intervertebral disc of lumbar region 08/19/2019     Priority: Medium    Family history of colon cancer 08/19/2019     Priority: Medium    Essential hypertension 06/02/2017     Priority: Medium     Last Assessment & Plan:   Formatting of this note might be different from the original.  The patient is now on a different blood pressure regimen with a combination of amlodipine 5 mg and atenolol 25 mg daily.  His blood pressure is under good control here in clinic and he has been monitoring at home and it is the same.  Continue  Formatting of this note might be different from the original.  Last Assessment & Plan:   Blood pressure too low; reduced Lisinopril dose to 10/12.5 mg daily        Past Medical History:   Diagnosis Date    Anemia     mild    Chronic low back pain     Fingernail abnormalities     \"changes in fingernails\"     Hypertension     Inability to maintain erection     Lumbar spine scoliosis      Past Surgical History:   Procedure Laterality Date    ARTHROPLASTY SHOULDER Left 8/23/2016    Procedure: ARTHROPLASTY SHOULDER;  Surgeon: Varinder Perez MD;  Location: [facility] OR    OPEN REDUCTION INTERNAL FIXATION ANKLE Right 1973     Current Outpatient Medications   Medication Sig " Dispense Refill    amLODIPine (NORVASC) 10 MG tablet Take 1 tablet (10 mg) by mouth daily 90 tablet 3    cyanocobalamin (VITAMIN B-12) 100 MCG tablet Take 100 mcg by mouth daily      folic acid (FOLVITE) 400 MCG tablet Take 400 mcg by mouth daily.      gabapentin (NEURONTIN) 300 MG capsule Take 2 capsules (600 mg) by mouth 3 times daily 360 capsule 3    losartan (COZAAR) 50 MG tablet Take 1 tablet (50 mg) by mouth daily 90 tablet 3    Omega-3 Fatty Acids (OMEGA-3 FISH OIL PO) Take 1,200 mg by mouth 2 times daily (with meals).      rosuvastatin (CRESTOR) 10 MG tablet TAKE 1 TABLET BY MOUTH DAILY 90 tablet 0    sildenafil (REVATIO) 20 MG tablet Take 3-5 tablets PO 1 hour prior to intercourse Strength: 20 mg 40 tablet 4    sodium bicarbonate 325 MG tablet Take 1 tablet (325 mg) by mouth 2 times daily (Patient taking differently: Take 325 mg by mouth once.) 180 tablet 3    VITAMIN D, CHOLECALCIFEROL, PO Take 2,000 Units by mouth daily         No Known Allergies     Social History     Tobacco Use    Smoking status: Former    Smokeless tobacco: Never   Substance Use Topics    Alcohol use: Not Currently     Alcohol/week: 3.0 standard drinks of alcohol     Family History   Problem Relation Age of Onset    Coronary Artery Disease Father     Colon Cancer Cousin     Depression Brother      History   Drug Use Unknown             Review of Systems  CONSTITUTIONAL: NEGATIVE for fever, chills, change in weight  INTEGUMENTARY/SKIN: NEGATIVE for worrisome rashes, moles or lesions  EYES: NEGATIVE for vision changes or irritation  ENT/MOUTH: NEGATIVE for ear, mouth and throat problems  RESP: NEGATIVE for significant cough or SOB  BREAST: NEGATIVE for masses, tenderness or discharge  CV: NEGATIVE for chest pain, palpitations or peripheral edema  GI: NEGATIVE for nausea, abdominal pain, heartburn, or change in bowel habits  : NEGATIVE for frequency, dysuria, or hematuria  MUSCULOSKELETAL: NEGATIVE for significant arthralgias or  "myalgia  NEURO: NEGATIVE for weakness, dizziness or paresthesias  ENDOCRINE: NEGATIVE for temperature intolerance, skin/hair changes  HEME: NEGATIVE for bleeding problems  PSYCHIATRIC: NEGATIVE for changes in mood or affect    Objective    /71 (BP Location: Left arm, Patient Position: Left side, Cuff Size: Adult Large)   Pulse 61   Temp 97.8  F (36.6  C) (Oral)   Resp 16   Ht 1.727 m (5' 8\")   Wt 73.5 kg (162 lb)   SpO2 96%   BMI 24.63 kg/m     Estimated body mass index is 24.63 kg/m  as calculated from the following:    Height as of this encounter: 1.727 m (5' 8\").    Weight as of this encounter: 73.5 kg (162 lb).  Physical Exam  GENERAL: alert and no distress  EYES: Eyes grossly normal to inspection, PERRL and conjunctivae and sclerae normal  HENT: ear canals and TM's normal, nose and mouth without ulcers or lesions  NECK: no adenopathy, no asymmetry, masses, or scars  RESP: lungs clear to auscultation - no rales, rhonchi or wheezes  CV: regular rate and rhythm, normal S1 S2, no S3 or S4, no murmur, click or rub, no peripheral edema  ABDOMEN: soft, nontender, no hepatosplenomegaly, no masses and bowel sounds normal  MS: no gross musculoskeletal defects noted, no edema  SKIN: no suspicious lesions or rashes  NEURO: Normal strength and tone, mentation intact and speech normal  PSYCH: mentation appears normal, affect normal/bright    Recent Labs   Lab Test 01/28/25  1550 08/19/24  0805 03/27/24  1311   HGB 12.5* 12.2*  --     318  --     139  --    POTASSIUM 4.2 4.3  --    CR 1.48* 1.30*  --    A1C  --   --  5.2        Diagnostics  Labs: included  EKG: Sinus bradycardia   Left axis deviation   Non-specific intra-ventricular conduction delay   Abnormal ECG   No previous ECGs availabl     Revised Cardiac Risk Index (RCRI)  The patient has the following serious cardiovascular risks for perioperative complications:   - No serious cardiac risks = 0 points     RCRI Interpretation: 0 points: Class " I (very low risk - 0.4% complication rate)      The longitudinal plan of care for the diagnosis(es)/condition(s) as documented were addressed during this visit. Due to the added complexity in care, I will continue to support Don in the subsequent management and with ongoing continuity of care.       Signed Electronically by: DIEGO CHOWDHURY MD  A copy of this evaluation report is provided to the requesting physician.

## 2025-02-06 ENCOUNTER — HOSPITAL ENCOUNTER (OUTPATIENT)
Dept: CARDIOLOGY | Facility: CLINIC | Age: 80
End: 2025-02-06
Attending: FAMILY MEDICINE
Payer: COMMERCIAL

## 2025-02-06 DIAGNOSIS — I45.4 INTRAVENTRICULAR CONDUCTION DEFECT: ICD-10-CM

## 2025-02-06 LAB — LVEF ECHO: NORMAL

## 2025-02-06 PROCEDURE — 93306 TTE W/DOPPLER COMPLETE: CPT

## 2025-02-10 ENCOUNTER — MYC REFILL (OUTPATIENT)
Dept: FAMILY MEDICINE | Facility: CLINIC | Age: 80
End: 2025-02-10
Payer: COMMERCIAL

## 2025-02-10 DIAGNOSIS — E78.5 HYPERLIPIDEMIA, UNSPECIFIED HYPERLIPIDEMIA TYPE: ICD-10-CM

## 2025-02-10 DIAGNOSIS — M51.360 DEGENERATION OF INTERVERTEBRAL DISC OF LUMBAR REGION WITH DISCOGENIC BACK PAIN: ICD-10-CM

## 2025-02-11 RX ORDER — GABAPENTIN 300 MG/1
600 CAPSULE ORAL 3 TIMES DAILY
Qty: 360 CAPSULE | Refills: 3 | Status: SHIPPED | OUTPATIENT
Start: 2025-02-11

## 2025-02-11 RX ORDER — ROSUVASTATIN CALCIUM 10 MG/1
10 TABLET, COATED ORAL DAILY
Qty: 90 TABLET | Refills: 2 | Status: SHIPPED | OUTPATIENT
Start: 2025-02-11

## 2025-02-11 NOTE — TELEPHONE ENCOUNTER
Patient calling for update on refill request. Patient is out rosuvastatin. Requesting this be expedited.

## 2025-03-01 ENCOUNTER — MYC REFILL (OUTPATIENT)
Dept: FAMILY MEDICINE | Facility: CLINIC | Age: 80
End: 2025-03-01
Payer: COMMERCIAL

## 2025-03-01 DIAGNOSIS — I10 ESSENTIAL HYPERTENSION: ICD-10-CM

## 2025-03-03 RX ORDER — AMLODIPINE BESYLATE 10 MG/1
10 TABLET ORAL DAILY
Qty: 90 TABLET | Refills: 3 | Status: SHIPPED | OUTPATIENT
Start: 2025-03-03

## 2025-03-03 RX ORDER — LOSARTAN POTASSIUM 50 MG/1
50 TABLET ORAL DAILY
Qty: 90 TABLET | Refills: 3 | Status: SHIPPED | OUTPATIENT
Start: 2025-03-03

## 2025-03-19 ENCOUNTER — TRANSFERRED RECORDS (OUTPATIENT)
Dept: HEALTH INFORMATION MANAGEMENT | Facility: CLINIC | Age: 80
End: 2025-03-19
Payer: COMMERCIAL

## 2025-04-05 ENCOUNTER — HEALTH MAINTENANCE LETTER (OUTPATIENT)
Age: 80
End: 2025-04-05

## 2025-05-05 ENCOUNTER — TELEPHONE (OUTPATIENT)
Dept: FAMILY MEDICINE | Facility: CLINIC | Age: 80
End: 2025-05-05
Payer: COMMERCIAL

## 2025-05-05 NOTE — TELEPHONE ENCOUNTER
Patient Quality Outreach    Patient is due for the following:   Physical Annual Wellness Visit      Topic Date Due    Diptheria Tetanus Pertussis (DTAP/TDAP/TD) Vaccine (2 - Td or Tdap) 12/26/2024    COVID-19 Vaccine (8 - 2024-25 season) 04/03/2025       Action(s) Taken:   Schedule a Annual Wellness Visit    Type of outreach:    Sent Hunie message.    Questions for provider review:    None         Traci Cunha  Chart routed to None.

## 2025-05-12 NOTE — TELEPHONE ENCOUNTER
Patient Quality Outreach    Patient is due for the following:   Physical Annual Wellness Visit      Topic Date Due    Diptheria Tetanus Pertussis (DTAP/TDAP/TD) Vaccine (2 - Td or Tdap) 12/26/2024    COVID-19 Vaccine (8 - 2024-25 season) 04/03/2025       Action(s) Taken:   Patient was scheduled for Annual Wellness    Type of outreach:    Chart review performed, no outreach needed.    Questions for provider review:    None         Traci Cunha  Chart routed to None.

## 2025-07-03 ENCOUNTER — TELEPHONE (OUTPATIENT)
Dept: FAMILY MEDICINE | Facility: CLINIC | Age: 80
End: 2025-07-03
Payer: COMMERCIAL

## 2025-07-03 DIAGNOSIS — I10 ESSENTIAL HYPERTENSION: ICD-10-CM

## 2025-07-03 DIAGNOSIS — N18.31 STAGE 3A CHRONIC KIDNEY DISEASE (H): Primary | ICD-10-CM

## 2025-07-03 NOTE — TELEPHONE ENCOUNTER
Spoke with patient - Patient is requesting specifically any kidney related lab work prior to annual exam so he can discuss results with Dr. Bess during the visit. Additionally, patient is requesting any cholesterol or other labs that are recommended by Dr. Bess.    Requesting call back with provider response at [phone] (patient's land line) if cell phone does not work.

## 2025-07-03 NOTE — TELEPHONE ENCOUNTER
General Call      Reason for Call:  QUESTION ON UPCOMING ANNUAL     What are your questions or concerns:  PT WAS WONDERING   IF HE NEEDS A BLOOD DRAW BEFORE HIS ANNUAL ON 07/10/2025 WITH DR. CHOWDHURY.        Date of last appointment with provider: 02/03/2025    Could we send this information to you in [place] or would you prefer to receive a phone call?:   Patient would prefer a phone call   Okay to leave a detailed message?: Yes at Cell number on file:    Telephone Information:   Mobile [phone]

## 2025-07-05 SDOH — HEALTH STABILITY: PHYSICAL HEALTH: ON AVERAGE, HOW MANY DAYS PER WEEK DO YOU ENGAGE IN MODERATE TO STRENUOUS EXERCISE (LIKE A BRISK WALK)?: 6 DAYS

## 2025-07-05 SDOH — HEALTH STABILITY: PHYSICAL HEALTH: ON AVERAGE, HOW MANY MINUTES DO YOU ENGAGE IN EXERCISE AT THIS LEVEL?: 90 MIN

## 2025-07-05 ASSESSMENT — SOCIAL DETERMINANTS OF HEALTH (SDOH): HOW OFTEN DO YOU GET TOGETHER WITH FRIENDS OR RELATIVES?: ONCE A WEEK

## 2025-07-10 ENCOUNTER — OFFICE VISIT (OUTPATIENT)
Dept: FAMILY MEDICINE | Facility: CLINIC | Age: 80
End: 2025-07-10
Payer: COMMERCIAL

## 2025-07-10 VITALS
WEIGHT: 168.6 LBS | SYSTOLIC BLOOD PRESSURE: 113 MMHG | OXYGEN SATURATION: 93 % | DIASTOLIC BLOOD PRESSURE: 65 MMHG | HEART RATE: 64 BPM | RESPIRATION RATE: 14 BRPM | TEMPERATURE: 98.4 F | HEIGHT: 68 IN | BODY MASS INDEX: 25.55 KG/M2

## 2025-07-10 DIAGNOSIS — I10 ESSENTIAL HYPERTENSION: ICD-10-CM

## 2025-07-10 DIAGNOSIS — Z00.00 ENCOUNTER FOR MEDICARE ANNUAL WELLNESS EXAM: Primary | ICD-10-CM

## 2025-07-10 DIAGNOSIS — D47.2 MGUS (MONOCLONAL GAMMOPATHY OF UNKNOWN SIGNIFICANCE): ICD-10-CM

## 2025-07-10 DIAGNOSIS — N18.31 STAGE 3A CHRONIC KIDNEY DISEASE (H): ICD-10-CM

## 2025-07-10 DIAGNOSIS — H61.21 IMPACTED CERUMEN OF RIGHT EAR: ICD-10-CM

## 2025-07-10 DIAGNOSIS — M51.360 DEGENERATION OF INTERVERTEBRAL DISC OF LUMBAR REGION WITH DISCOGENIC BACK PAIN: ICD-10-CM

## 2025-07-10 DIAGNOSIS — Z23 NEED FOR VACCINATION: ICD-10-CM

## 2025-07-10 RX ORDER — GABAPENTIN 300 MG/1
600 CAPSULE ORAL 3 TIMES DAILY
Qty: 540 CAPSULE | Refills: 3 | Status: SHIPPED | OUTPATIENT
Start: 2025-07-10

## 2025-07-10 NOTE — ASSESSMENT & PLAN NOTE
Due for follow-up protein electrophoresis which was ordered today.  He is asymptomatic.  Orders:    Protein electrophoresis; Future

## 2025-07-10 NOTE — PROGRESS NOTES
"Preventive Care Visit  [facility]  DIEGO CHOWDHURY MD, Family Medicine  Jul 10, 2025      Assessment & Plan   Assessment & Plan  Encounter for Medicare annual wellness exam  The patient received an updated COVID booster today.  I also advised him that he is due for a tetanus shot and a prescription for that was sent to his pharmacy.  He just recently had a colonoscopy 2 days ago; reviewed the pathology results with him which showed 4 polyps all either sessile serrated or tubular.  We will reassess need for colonoscopy in 5 years depending on his health.  We reviewed cardiovascular health and he is adhering to a healthy diet and exercising regularly.       Need for vaccination    Orders:    Tdap, tetanus-diptheria-acell pertussis, (BOOSTRIX) 5-2.5-18.5 LF-MCG/0.5 PAULA injection; Inject 0.5 mLs into the muscle once for 1 dose.    Degeneration of intervertebral disc of lumbar region with discogenic back pain  Continue gabapentin and prescription refill provided.  Orders:    gabapentin (NEURONTIN) 300 MG capsule; Take 2 capsules (600 mg) by mouth 3 times daily.    MGUS (monoclonal gammopathy of unknown significance)  Due for follow-up protein electrophoresis which was ordered today.  He is asymptomatic.  Orders:    Protein electrophoresis; Future    Essential hypertension  Pressure under excellent control on amlodipine 10 mg and losartan 50 mg daily.  Orders:    Lipid Profile (Chol, Trig, HDL, LDL calc)    Stage 3a chronic kidney disease (H)  Continues to follow with nephrology regarding this.  Orders:    Renal panel (Alb, BUN, Ca, Cl, CO2, Creat, Gluc, Phos, K, Na)    Impacted cerumen of right ear  Excessively removed large and hard wax from his right ear canal.  Orders:    REMOVE IMPACTED CERUMEN      BMI  Estimated body mass index is 25.64 kg/m  as calculated from the following:    Height as of this encounter: 1.727 m (5' 8\").    Weight as of this encounter: 76.5 kg (168 lb 9.6 oz). "       Counseling  Appropriate preventive services were addressed with this patient via screening, questionnaire, or discussion as appropriate for fall prevention, nutrition, physical activity, Tobacco-use cessation, social engagement, weight loss and cognition.  Checklist reviewing preventive services available has been given to the patient.  Reviewed patient's diet, addressing concerns and/or questions.   Reviewed preventive health counseling, as reflected in patient instructions       Regular exercise       Healthy diet/nutrition       Colorectal cancer screening        Subjective   Jairo is a 79 year old presents today for an annual physical exam.  The patient feels well overall without any specific complaints or concerns today.  Physical        7/10/2025    11:10 AM   Additional Questions   Roomed by ac   Accompanied by self        Advance Care Planning    Discussed advance care planning with patient; however, patient declined at this time.        7/5/2025   General Health   How would you rate your overall physical health? Good   Feel stress (tense, anxious, or unable to sleep) Not at all         7/5/2025   Nutrition   Diet: Regular (no restrictions)         7/5/2025   Exercise   Days per week of moderate/strenous exercise 6 days   Average minutes spent exercising at this level 90 min         7/5/2025   Social Factors   Frequency of gathering with friends or relatives Once a week   Worry food won't last until get money to buy more No   Food not last or not have enough money for food? No   Do you have housing? (Housing is defined as stable permanent housing and does not include staying outside in a car, in a tent, in an abandoned building, in an overnight shelter, or couch-surfing.) Yes   Are you worried about losing your housing? No   Lack of transportation? No   Unable to get utilities (heat,electricity)? No         7/5/2025   Fall Risk   Fallen 2 or more times in the past year? No   Trouble with walking or  balance? No          2025   Activities of Daily Living- Home Safety   Needs help with the following daily activites None of the above   Safety concerns in the home None of the above         2025   Dental   Dentist two times every year? Yes         2025   Hearing Screening   Hearing concerns? None of the above         2025   Driving Risk Screening   Patient/family members have concerns about driving No         2025   General Alertness/Fatigue Screening   Have you been more tired than usual lately? No         2025   Urinary Incontinence Screening   Bothered by leaking urine in past 6 months No         Today's PHQ-2 Score:       7/10/2025     8:55 AM   PHQ-2 (  [employer])   Q1: Little interest or pleasure in doing things 0   Q2: Feeling down, depressed or hopeless 0   PHQ-2 Score 0    Q1: Little interest or pleasure in doing things Not at all   Q2: Feeling down, depressed or hopeless Not at all   PHQ-2 Score 0       Patient-reported           2025   Substance Use   Alcohol more than 3/day or more than 7/wk Not Applicable   Do you have a current opioid prescription? No   How severe/bad is pain from 1 to 10? 5/10   Do you use any other substances recreationally? No     Social History     Tobacco Use    Smoking status: Former     Current packs/day: 0.00     Average packs/day: 0.5 packs/day for 3.0 years (1.5 ttl pk-yrs)     Types: Cigarettes     Start date: 1963     Quit date: 1966     Years since quittin.5    Smokeless tobacco: Never   Vaping Use    Vaping status: Never Used   Substance Use Topics    Alcohol use: Not Currently     Alcohol/week: 3.0 standard drinks of alcohol     Comment: Quit alcohol nearly 2 years ago    Drug use: Never       ASCVD Risk   The 10-year ASCVD risk score (Kika SUMMERS, et al., 2019) is: 27.5%    Values used to calculate the score:      Age: 79 years      Sex: Male      Is Non- : No      Diabetic: No      Tobacco smoker:  No      Systolic Blood Pressure: 113 mmHg      Is BP treated: Yes      HDL Cholesterol: 51 mg/dL      Total Cholesterol: 139 mg/dL      Reviewed and updated as needed this visit by Provider                    Patient Active Problem List   Diagnosis    Degeneration of intervertebral disc of lumbar region    Essential hypertension    Stage 3a chronic kidney disease (H)    Family history of colon cancer    Erectile dysfunction, unspecified erectile dysfunction type    MGUS (monoclonal gammopathy of unknown significance)     Past Surgical History:   Procedure Laterality Date    ARTHROPLASTY SHOULDER Left 2016    Procedure: ARTHROPLASTY SHOULDER;  Surgeon: Varinder Perez MD;  Location: [facility] OR    COLONOSCOPY  2018    EYE SURGERY  2008    Cataracts, bilateral    OPEN REDUCTION INTERNAL FIXATION ANKLE Right 1973       Social History     Tobacco Use    Smoking status: Former     Current packs/day: 0.00     Average packs/day: 0.5 packs/day for 3.0 years (1.5 ttl pk-yrs)     Types: Cigarettes     Start date: 1963     Quit date: 1966     Years since quittin.5    Smokeless tobacco: Never   Substance Use Topics    Alcohol use: Not Currently     Alcohol/week: 3.0 standard drinks of alcohol     Comment: Quit alcohol nearly 2 years ago     Family History   Problem Relation Age of Onset    Coronary Artery Disease Father     Colon Cancer Cousin     Depression Brother          Current Outpatient Medications   Medication Sig Dispense Refill    amLODIPine (NORVASC) 10 MG tablet Take 1 tablet (10 mg) by mouth daily. 90 tablet 3    cyanocobalamin (VITAMIN B-12) 100 MCG tablet Take 100 mcg by mouth daily      folic acid (FOLVITE) 400 MCG tablet Take 400 mcg by mouth daily.      gabapentin (NEURONTIN) 300 MG capsule Take 2 capsules (600 mg) by mouth 3 times daily. 540 capsule 3    losartan (COZAAR) 50 MG tablet Take 1 tablet (50 mg) by mouth daily. 90 tablet 3    Omega-3 Fatty Acids (OMEGA-3 FISH OIL PO) Take  1,200 mg by mouth 2 times daily (with meals).      rosuvastatin (CRESTOR) 10 MG tablet Take 1 tablet (10 mg) by mouth daily. 90 tablet 2    sodium bicarbonate 325 MG tablet Take 1 tablet (325 mg) by mouth 2 times daily (Patient taking differently: Take 325 mg by mouth once.) 180 tablet 3    Tdap, tetanus-diptheria-acell pertussis, (BOOSTRIX) 5-2.5-18.5 LF-MCG/0.5 PAULA injection Inject 0.5 mLs into the muscle once for 1 dose. 0.5 mL 0    VITAMIN D, CHOLECALCIFEROL, PO Take 2,000 Units by mouth daily       No Known Allergies  Current providers sharing in care for this patient include:  Patient Care Team:  Diana Bess MD as PCP - General (Family Medicine)  Diana Bess MD as Referring Physician (Family Practice)  Nissen, Rick L, MD as MD (Otolaryngology)  Katarzyna Mcdonough AuD as Audiologist (Audiology)  Diana Bess MD as Assigned PCP  Giovanni Martinez MD as MD (Neurology)  Giovanni Martinez MD as Assigned Neuroscience Provider  Jesse Montgomery DPM as Assigned Surgical Provider    The following health maintenance items are reviewed in Epic and correct as of today:  Health Maintenance   Topic Date Due    RSV VACCINE (1 - 1-dose 75+ series) Never done    DTAP/TDAP/TD VACCINE (2 - Td or Tdap) 12/26/2024    ANNUAL REVIEW OF HM ORDERS  02/19/2025    LIPID  08/19/2025    MICROALBUMIN  08/19/2025    INFLUENZA VACCINE (1) 09/01/2025    BMP  01/28/2026    HEMOGLOBIN  01/28/2026    MEDICARE ANNUAL WELLNESS VISIT  07/10/2026    FALL RISK ASSESSMENT  07/10/2026    DIABETES SCREENING  01/28/2028    ADVANCE CARE PLANNING  07/10/2030    HEPATITIS C SCREENING  Completed    PHQ-2 (once per calendar year)  Completed    PNEUMOCOCCAL VACCINE 50+ YEARS  Completed    URINALYSIS  Completed    ZOSTER VACCINE  Completed    COVID-19 VACCINE  Completed    HPV VACCINE  Aged Out    MENINGITIS VACCINE  Aged Out    COLORECTAL CANCER SCREENING  Discontinued    LUNG CANCER SCREENING  Discontinued            Objective   "  Exam  /65 (BP Location: Left arm, Patient Position: Sitting, Cuff Size: Adult Regular)   Pulse 64   Temp 98.4  F (36.9  C) (Oral)   Resp 14   Ht 1.727 m (5' 8\")   Wt 76.5 kg (168 lb 9.6 oz)   SpO2 93%   BMI 25.64 kg/m     Estimated body mass index is 25.64 kg/m  as calculated from the following:    Height as of this encounter: 1.727 m (5' 8\").    Weight as of this encounter: 76.5 kg (168 lb 9.6 oz).    Physical Exam  GENERAL: alert and no distress  EYES: Eyes grossly normal to inspection, PERRL and conjunctivae and sclerae normal  HENT: cerumen occluded right ear  NECK: no adenopathy, no asymmetry, masses, or scars  RESP: lungs clear to auscultation - no rales, rhonchi or wheezes  CV: regular rate and rhythm, normal S1 S2, no S3 or S4, no murmur, click or rub, no peripheral edema  ABDOMEN: soft, nontender, no hepatosplenomegaly, no masses and bowel sounds normal  MS: no gross musculoskeletal defects noted, no edema  SKIN: no suspicious lesions or rashes  NEURO: Normal strength and tone, mentation intact and speech normal  PSYCH: mentation appears normal, affect normal/bright        7/10/2025   Mini Cog   Clock Draw Score 2 Normal   3 Item Recall 3 objects recalled   Mini Cog Total Score 5     PROCEDURE: Cerumen removed successfully from the right ear by myself following irrigation and then I used a lighted ear curette.    The longitudinal plan of care for the diagnosis(es)/condition(s) as documented were addressed during this visit. Due to the added complexity in care, I will continue to support Don in the subsequent management and with ongoing continuity of care.         Signed Electronically by: DIEGO CHOWDHURY MD    "

## 2025-07-10 NOTE — ASSESSMENT & PLAN NOTE
Continues to follow with nephrology regarding this.  Orders:    Renal panel (Alb, BUN, Ca, Cl, CO2, Creat, Gluc, Phos, K, Na)

## 2025-07-10 NOTE — ASSESSMENT & PLAN NOTE
Pressure under excellent control on amlodipine 10 mg and losartan 50 mg daily.  Orders:    Lipid Profile (Chol, Trig, HDL, LDL calc)

## 2025-07-10 NOTE — PATIENT INSTRUCTIONS
Patient Education   Preventive Care Advice   This is general advice given by our system to help you stay healthy. However, your care team may have specific advice just for you. Please talk to your care team about your preventive care needs.  Nutrition  Eat 5 or more servings of fruits and vegetables each day.  Try wheat bread, brown rice and whole grain pasta (instead of white bread, rice, and pasta).  Get enough calcium and vitamin D. Check the label on foods and aim for 100% of the RDA (recommended daily allowance).  Lifestyle  Exercise at least 150 minutes each week  (30 minutes a day, 5 days a week).  Do muscle strengthening activities 2 days a week. These help control your weight and prevent disease.  No smoking.  Wear sunscreen to prevent skin cancer.  Have a dental exam and cleaning every 6 months.  Yearly exams  See your health care team every year to talk about:  Any changes in your health.  Any medicines your care team has prescribed.  Preventive care, family planning, and ways to prevent chronic diseases.  Shots (vaccines)   HPV shots (up to age 26), if you've never had them before.  Hepatitis B shots (up to age 59), if you've never had them before.  COVID-19 shot: Get this shot when it's due.  Flu shot: Get a flu shot every year.  Tetanus shot: Get a tetanus shot every 10 years.  Pneumococcal, hepatitis A, and RSV shots: Ask your care team if you need these based on your risk.  Shingles shot (for age 50 and up)  General health tests  Diabetes screening:  Starting at age 35, Get screened for diabetes at least every 3 years.  If you are younger than age 35, ask your care team if you should be screened for diabetes.  Cholesterol test: At age 39, start having a cholesterol test every 5 years, or more often if advised.  Bone density scan (DEXA): At age 50, ask your care team if you should have this scan for osteoporosis (brittle bones).  Hepatitis C: Get tested at least once in your life.  STIs (sexually  transmitted infections)  Before age 24: Ask your care team if you should be screened for STIs.  After age 24: Get screened for STIs if you're at risk. You are at risk for STIs (including HIV) if:  You are sexually active with more than one person.  You don't use condoms every time.  You or a partner was diagnosed with a sexually transmitted infection.  If you are at risk for HIV, ask about PrEP medicine to prevent HIV.  Get tested for HIV at least once in your life, whether you are at risk for HIV or not.  Cancer screening tests  Cervical cancer screening: If you have a cervix, begin getting regular cervical cancer screening tests starting at age 21.  Breast cancer scan (mammogram): If you've ever had breasts, begin having regular mammograms starting at age 40. This is a scan to check for breast cancer.  Colon cancer screening: It is important to start screening for colon cancer at age 45.  Have a colonoscopy test every 10 years (or more often if you're at risk) Or, ask your provider about stool tests like a FIT test every year or Cologuard test every 3 years.  To learn more about your testing options, visit:   .  For help making a decision, visit:   https://bit.ly/ji96191.  Prostate cancer screening test: If you have a prostate, ask your care team if a prostate cancer screening test (PSA) at age 55 is right for you.  Lung cancer screening: If you are a current or former smoker ages 50 to 80, ask your care team if ongoing lung cancer screenings are right for you.  For informational purposes only. Not to replace the advice of your health care provider. Copyright   2023 [employer]. All rights reserved. Clinically reviewed by the [facility]itions Program. [employer] 126129 - REV 01/24.

## 2025-07-10 NOTE — ASSESSMENT & PLAN NOTE
Continue gabapentin and prescription refill provided.  Orders:    gabapentin (NEURONTIN) 300 MG capsule; Take 2 capsules (600 mg) by mouth 3 times daily.

## 2025-07-11 PROBLEM — D12.6 ADENOMATOUS POLYP OF COLON: Status: ACTIVE | Noted: 2025-07-11

## 2025-07-12 LAB
ALBUMIN SERPL ELPH-MCNC: 4.4 G/DL (ref 3.7–5.1)
ALPHA1 GLOB SERPL ELPH-MCNC: 0.3 G/DL (ref 0.2–0.4)
ALPHA2 GLOB SERPL ELPH-MCNC: 0.5 G/DL (ref 0.5–0.9)
B-GLOBULIN SERPL ELPH-MCNC: 0.7 G/DL (ref 0.6–1)
GAMMA GLOB SERPL ELPH-MCNC: 0.7 G/DL (ref 0.7–1.6)
M PROTEIN SERPL ELPH-MCNC: 0.2 G/DL
PROT PATTERN SERPL ELPH-IMP: ABNORMAL

## 2025-07-15 ENCOUNTER — OFFICE VISIT (OUTPATIENT)
Dept: NEUROLOGY | Facility: CLINIC | Age: 80
End: 2025-07-15
Payer: COMMERCIAL

## 2025-07-15 VITALS
DIASTOLIC BLOOD PRESSURE: 59 MMHG | HEIGHT: 68 IN | SYSTOLIC BLOOD PRESSURE: 118 MMHG | BODY MASS INDEX: 25.31 KG/M2 | HEART RATE: 65 BPM | WEIGHT: 167 LBS

## 2025-07-15 DIAGNOSIS — E67.2 HYPERVITAMINOSIS B6: ICD-10-CM

## 2025-07-15 DIAGNOSIS — D47.2 MONOCLONAL PARAPROTEINEMIA: ICD-10-CM

## 2025-07-15 DIAGNOSIS — G63 VITAMIN B6 DEFICIENCY NEUROPATHY: Primary | ICD-10-CM

## 2025-07-15 DIAGNOSIS — G62.9 NEUROPATHY: ICD-10-CM

## 2025-07-15 DIAGNOSIS — E53.1 VITAMIN B6 DEFICIENCY NEUROPATHY: Primary | ICD-10-CM

## 2025-07-15 PROCEDURE — 3074F SYST BP LT 130 MM HG: CPT | Performed by: PSYCHIATRY & NEUROLOGY

## 2025-07-15 PROCEDURE — 3078F DIAST BP <80 MM HG: CPT | Performed by: PSYCHIATRY & NEUROLOGY

## 2025-07-15 PROCEDURE — G2211 COMPLEX E/M VISIT ADD ON: HCPCS | Performed by: PSYCHIATRY & NEUROLOGY

## 2025-07-15 PROCEDURE — 99214 OFFICE O/P EST MOD 30 MIN: CPT | Performed by: PSYCHIATRY & NEUROLOGY

## 2025-07-15 NOTE — PROGRESS NOTES
NEUROLOGY OUTPATIENT PROGRESS NOTE   Jul 15, 2025     CHIEF COMPLAINT/REASON FOR VISIT/REASON FOR CONSULT  Patient presents with:  NEUROPATHY: 6 month follow up. Pt states neuropathy has improved since last visit. Less numbness and less pain.    REASON FOR CONSULTATION-foot pain/numbness.    REFERRAL SOURCE  Dr. Diana Bess  CC Dr. Diana Bess    HISTORY OF PRESENT ILLNESS  Crispin Farr is a 79 year old male seen today for evaluation of foot pain/numbness.  Symptoms started about June 2022.  He describes numbness and tingling in his toes and the balls of his feet.  Does have deep pain in his heels.  This is worse with standing and putting pressure on the foot.  Touching the skin does not make the pain worse.  Denies any pins-and-needles or burning pain.  He was carrying a lot of stuff and walking on hard surfaces in June when the symptoms came on.  Does not have any history of diabetes.  He has been taking vitamin B6 supplements for several years.  Blood work done through primary care has shown abnormal serum for electrophoresis.  Multiple family members in his family have neuropathy.  Reports no weight gain.  Have chronic back pain with no worsening.  No shooting pain down the legs.  No weakness or balance issues.  No back surgeries in the past.  For his pain he is on gabapentin and Tylenol which is helping.    Does have occasional positional numbness of his right hand.  No other numbness in the hands.  No major neck pain.    1/15/25  Patient returns today.  Overall neuropathy symptoms have been improving.  He was found to have really high vitamin B6 and has stopped the B6 supplement and his multivitamin and that has significantly helped.  The pain in the feet is resolved as well  2.  He was seen by podiatry and diagnosed to have plantar fasciitis.  He has been using Dr. Chirinos's inserts which has been helping.  Complain of back pain.  He wants to do physical therapy.  Wants to hold off on  "surgery.  No other new concerns.    7/15/25  Patient returns today  1.  Feels that his neuropathy is significantly improved since he was last here.  Feels that there might be some numbness in the distal part of both feet but this is very minimal and slowly getting better.  There is no pain in the feet.  He has been extremely active and exercising.  2.  Back pain is much better compared to before.  3.  Is no longer doing the vitamin B complex.  Discussed about checking the blood work again to see if the level is now normalized.  4.  Continues to follow-up with hematology.  No ongoing concerns.  No other new concerns.    Previous history is reviewed and this is unchanged.    PAST MEDICAL/SURGICAL HISTORY  Past Medical History:   Diagnosis Date    Anemia     mild    Arthritis 2000    Right ankle, lumbar spine    Chronic low back pain     Fingernail abnormalities     \"changes in fingernails\"     Hypertension     Inability to maintain erection     Lumbar spine scoliosis      Patient Active Problem List   Diagnosis    Degeneration of intervertebral disc of lumbar region    Essential hypertension    Stage 3a chronic kidney disease (H)    Family history of colon cancer    Erectile dysfunction, unspecified erectile dysfunction type    MGUS (monoclonal gammopathy of unknown significance)    Adenomatous polyp of colon   Significant high cholesterol, high blood pressure    FAMILY HISTORY  Family History   Problem Relation Age of Onset    Coronary Artery Disease Father     Colon Cancer Cousin     Depression Brother    Significant stroke, heart attack, high cholesterol, high blood pressure, tremors, memory loss, vision loss.  His mother and sisters might have neuropathy.    SOCIAL HISTORY  Social History     Tobacco Use    Smoking status: Former     Current packs/day: 0.00     Average packs/day: 0.5 packs/day for 3.0 years (1.5 ttl pk-yrs)     Types: Cigarettes     Start date: 8/1/1963     Quit date: 1/1/1966     Years since " "quittin.5    Smokeless tobacco: Never   Vaping Use    Vaping status: Never Used   Substance Use Topics    Alcohol use: Not Currently     Alcohol/week: 3.0 standard drinks of alcohol     Comment: Quit alcohol nearly 2 years ago    Drug use: Never       SYSTEMS REVIEW  Twelve-system ROS was done and other than the HPI this was negative/positive for back pain, joint pain, numbness/tingling, movement disorder with right hand shaking on the computer, bladder symptoms, impotence.    MEDICATIONS  Current Outpatient Medications   Medication Sig Dispense Refill    amLODIPine (NORVASC) 10 MG tablet Take 1 tablet (10 mg) by mouth daily. 90 tablet 3    cyanocobalamin (VITAMIN B-12) 100 MCG tablet Take 100 mcg by mouth daily      folic acid (FOLVITE) 400 MCG tablet Take 400 mcg by mouth daily.      gabapentin (NEURONTIN) 300 MG capsule Take 2 capsules (600 mg) by mouth 3 times daily. 540 capsule 3    losartan (COZAAR) 50 MG tablet Take 1 tablet (50 mg) by mouth daily. 90 tablet 3    Omega-3 Fatty Acids (OMEGA-3 FISH OIL PO) Take 1,200 mg by mouth 2 times daily (with meals).      rosuvastatin (CRESTOR) 10 MG tablet Take 1 tablet (10 mg) by mouth daily. 90 tablet 2    sodium bicarbonate 325 MG tablet Take 1 tablet (325 mg) by mouth 2 times daily (Patient taking differently: Take 325 mg by mouth once.) 180 tablet 3    VITAMIN D, CHOLECALCIFEROL, PO Take 2,000 Units by mouth daily       No current facility-administered medications for this visit.        PHYSICAL EXAMINATION  VITALS: /59 (BP Location: Right arm, Patient Position: Sitting)   Pulse 65   Ht 1.727 m (5' 8\")   Wt 75.8 kg (167 lb)   BMI 25.39 kg/m    GENERAL: Healthy appearing, alert, no acute distress, normal habitus.  CARDIOVASCULAR: Extremities warm and well perfused. Pulses present.   NEUROLOGICAL:  Patient is awake and oriented to self, place and time.  Attention span is normal.  Memory is grossly intact.  Language is fluent and follows commands " appropriately.  Appropriate fund of knowledge. Cranial nerves 2-12 are intact. There is no pronator drift.  Motor exam shows 5/5 strength in all extremities.  Tone is symmetric bilaterally in upper and lower extremities.  Reflexes are symmetric and 2+ in upper extremities and lower extremities.  Ankle jerks absent.  Sensory exam is grossly intact to light touch, pin prick and vibration with decreased sensation to the midshin level.  Finger to nose and heel to shin is without dysmetria.  Romberg is negative.  Gait is normal and the patient is able to do tandem walk and walk on toes and heels with some difficulty. Mild vocal tremor.  Exam stable.  Vibratory sense was intact in the ankle along with pinprick sensation.  Stable from last time but significant improvement compared to before.    DIAGNOSTICS  MRI  IMPRESSION:  1.  Severe spinal canal stenosis at L4-L5 and L3-L4.  2.  Mild spinal canal stenosis at L2-L3 and L1-L2.  3.  Severe right foraminal stenosis at L3-L4 with ganglionic impingement.  4.  Moderate to severe left foraminal stenosis at L4-L5 with exiting nerve root contact/impingement.  5.  Levoconvex degenerative curvature.  6.  Endplate and facet edema consistent with active degenerative disc disease and facet disease and may function as a source of low back pain. Correlate clinically.    EMG 2024  Interpretation:  This is an abnormal EMG.  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  This would be mild to moderate in severity.     RELEVANT LABS  Component      Latest Ref Rng 12/18/2023  4:22 PM 2/19/2024  12:49 PM 3/27/2024  1:11 PM   Sodium      135 - 145 mmol/L 138      Potassium      3.4 - 5.3 mmol/L 4.5      Chloride      98 - 107 mmol/L 103      Carbon Dioxide (CO2)      22 - 29 mmol/L 24      Anion Gap      7 - 15 mmol/L 11      Glucose      70 - 99 mg/dL 119 (H)      Urea Nitrogen      8.0 - 23.0 mg/dL 20.1       Creatinine      0.67 - 1.17 mg/dL 1.20 (H)      GFR Estimate      >60 mL/min/1.73m2 62      Calcium      8.8 - 10.2 mg/dL 9.8      Albumin      3.5 - 5.2 g/dL 4.7      Phosphorus      2.5 - 4.5 mg/dL 3.2      Albumin Fraction      3.7 - 5.1 g/dL   4.8    Alpha 1 Fraction      0.2 - 0.4 g/dL   0.3    Alpha 2 Fraction      0.5 - 0.9 g/dL   0.6    Beta Fraction      0.6 - 1.0 g/dL   0.9    Gamma Fraction      0.7 - 1.6 g/dL   0.8    Monoclonal Peak      <=0.0 g/dL   0.2 (H)    ELP Interpretation:   Small monoclonal protein (0.2 g/dL) seen in the beta fraction, not previously characterized in our laboratory. Recommend serum and urine immunofixation for confirmation and further characterization if not previously performed elsewhere. Pathologic significance requires clinical correlation. Ela Wilson M.D.    Vitamin B12      232 - 1,245 pg/mL  1,033     WILLIAM interpretation      Negative    Negative    TSH      0.30 - 4.20 uIU/mL   1.51    Hemoglobin A1C      0.0 - 5.6 %   5.2    Total Protein Serum for ELP      6.4 - 8.3 g/dL   7.4    Immunofixation ELP   Monoclonal IgG immunoglobulin of kappa light chain type. Monoclonal free immunoglobulin light chain of lambda chain type. Monoclonal antibody therapeutics (e.g. Daratumumab) may appear as monoclonal proteins on serum electrophoresis and immunofixation. Results should be interpreted with caution. Pathologic significance requires clinical correlation. Maximino Villa MD       Legend:  (H) High    OUTSIDE RECORDS  Outside referral notes and chart notes were reviewed and pertinent information has been summarized (in addition to the HPI):-          LABS  Component      Latest Ref Rng 4/11/2024  11:32 AM 4/11/2024  11:39 AM   Vitamin B1 Whole Blood Level      70 - 180 nmol/L 190 (H)     Copper      70.0 - 140.0 ug/dL 109.7     Lyme Disease Antibodies Serum      <0.90  0.06     Vitamin B6      20.0 - 125.0 nmol/L 426.0 (H)     Immunofix ELP Urine  Monoclonal free  immunoglobulin light chain of kappa chain type. Pathologic significance requires clinical correlation. Chiquis Almazan MD       Legend:  (H) High    Podiatry notes reviewed.    Component      Latest Ref Rng 1/22/2025  12:55 PM   Vitamin B6      20.0 - 125.0 nmol/L 146.4 (H)       Legend:  (H) High    IMPRESSION/REPORT/PLAN  Foot pain  Abnormal serum electrophoresis/MGUS  Neuropathy  Family history of neuropathy  Vitamin B6 use  Lumbar spinal stenosis  Elevated vitamin B6 level    This is a 79 year old male with numbness and pain in his feet.  His exam is suggestive of a neuropathy.  EMG is also confirmed diagnosis of neuropathy.  There is family history of neuropathy and his neuropathy could be related to genetic causes.  Other possibility would be idiopathic neuropathy.  He does have a positive serum protein electrophoresis and has been diagnosed with MGUS which could also be a cause for his neuropathy.  Blood work was otherwise negative.  Vitamin B6 level was elevated as he takes supplements which could be a cause of his neuropathy.  With stopping the supplements his neuropathy has started to improve and there is some improvement on exam as well.  He has made significant improvement with stopping the vitamin B6 supplement most likely suggesting neuropathy was from this.  There is some family history also.  Will recheck vitamin B6 level to see if it is normalized though long-term he should not go back on vitamin B6 unless it is low. Discussed limiting sweets and energy drinks.  Recommend healthy diet.    His pain does not seem consistent with neuropathy since it is not pins-and-needles or burning in nature.  Is worse with activity and generally neuropathic pain is worse with rest.  He has been diagnosed to have plantar fasciitis through podiatry.  Insoles are helping.    Can continue gabapentin and Tylenol which is helping.    He does have back pain.  I put in a referral for physical therapy.  Could consider  referral to the spine center if physical therapy does not help. Improving with exercises    Continue follow-up with hematology/oncology for MGUS.  Stable.    I can see him back in about 12 months.  Discussed problems of neuropathy.  Recommend exercise and healthy lifestyle.    -     Physical Therapy  Referral; Future  -     Vitamin B6; Future  - Continue follow-up with hematology/oncology for MGUS.    Return in about 1 year (around 7/15/2026) for In-Clinic Visit (must), After testing.    Over 30 minutes were spent coordinating the care for the patient on the day of the encounter.  This includes previsit, during visit and post visit activities as documented above.  Counseling patient.  Reviewing chart.  Testing ordered.  (Activities include but not inclusive of reviewing chart, reviewing outside records, reviewing labs and imaging study results as well as the images, patient visit time including getting history and exam,  use if applicable, review of test results with the patient and coming up with a plan in a shared model, counseling patient and family, education and answering patient questions, EMR , EMR diagnosis entry and problem list management, medication reconciliation and prescription management if applicable, paperwork if applicable, printing documents and documentation of the visit activities.)        Giovanni Martinez MD  Neurologist  [facility]  Tel:- [phone]    This note was dictated using voice recognition software.  Any grammatical or context distortions are unintentional and inherent to the software.    The longitudinal plan of care for the diagnosis(es)/condition(s) as documented were addressed during this visit. Due to the added complexity in care, I will continue to support Don in the subsequent management and with ongoing continuity of care.

## 2025-07-15 NOTE — NURSING NOTE
Chief Complaint   Patient presents with    NEUROPATHY     6 month follow up. Pt states neuropathy has improved since last visit. Less numbness and less pain.     Carmita Cornejo MA on 7/15/2025 at 10:24 AM

## 2025-07-15 NOTE — LETTER
7/15/2025      Crispin Farr  [address]  [facility] [address]      Dear Colleague,    Thank you for referring your patient, Crispin Farr, to the [facility] [place]. Please see a copy of my visit note below.    NEUROLOGY OUTPATIENT PROGRESS NOTE   Jul 15, 2025     CHIEF COMPLAINT/REASON FOR VISIT/REASON FOR CONSULT  Patient presents with:  NEUROPATHY: 6 month follow up. Pt states neuropathy has improved since last visit. Less numbness and less pain.    REASON FOR CONSULTATION-foot pain/numbness.    REFERRAL SOURCE  Dr. Diana Bess  CC Dr. Diana Bess    HISTORY OF PRESENT ILLNESS  Crispin Farr is a 79 year old male seen today for evaluation of foot pain/numbness.  Symptoms started about June 2022.  He describes numbness and tingling in his toes and the balls of his feet.  Does have deep pain in his heels.  This is worse with standing and putting pressure on the foot.  Touching the skin does not make the pain worse.  Denies any pins-and-needles or burning pain.  He was carrying a lot of stuff and walking on hard surfaces in June when the symptoms came on.  Does not have any history of diabetes.  He has been taking vitamin B6 supplements for several years.  Blood work done through primary care has shown abnormal serum for electrophoresis.  Multiple family members in his family have neuropathy.  Reports no weight gain.  Have chronic back pain with no worsening.  No shooting pain down the legs.  No weakness or balance issues.  No back surgeries in the past.  For his pain he is on gabapentin and Tylenol which is helping.    Does have occasional positional numbness of his right hand.  No other numbness in the hands.  No major neck pain.    1/15/25  Patient returns today.  Overall neuropathy symptoms have been improving.  He was found to have really high vitamin B6 and has stopped the B6 supplement and his multivitamin and that has significantly helped.  The pain in the  "feet is resolved as well  2.  He was seen by podiatry and diagnosed to have plantar fasciitis.  He has been using Dr. Chirinos's inserts which has been helping.  Complain of back pain.  He wants to do physical therapy.  Wants to hold off on surgery.  No other new concerns.    7/15/25  Patient returns today  1.  Feels that his neuropathy is significantly improved since he was last here.  Feels that there might be some numbness in the distal part of both feet but this is very minimal and slowly getting better.  There is no pain in the feet.  He has been extremely active and exercising.  2.  Back pain is much better compared to before.  3.  Is no longer doing the vitamin B complex.  Discussed about checking the blood work again to see if the level is now normalized.  4.  Continues to follow-up with hematology.  No ongoing concerns.  No other new concerns.    Previous history is reviewed and this is unchanged.    PAST MEDICAL/SURGICAL HISTORY  Past Medical History:   Diagnosis Date     Anemia     mild     Arthritis 2000    Right ankle, lumbar spine     Chronic low back pain      Fingernail abnormalities     \"changes in fingernails\"      Hypertension      Inability to maintain erection      Lumbar spine scoliosis      Patient Active Problem List   Diagnosis     Degeneration of intervertebral disc of lumbar region     Essential hypertension     Stage 3a chronic kidney disease (H)     Family history of colon cancer     Erectile dysfunction, unspecified erectile dysfunction type     MGUS (monoclonal gammopathy of unknown significance)     Adenomatous polyp of colon   Significant high cholesterol, high blood pressure    FAMILY HISTORY  Family History   Problem Relation Age of Onset     Coronary Artery Disease Father      Colon Cancer Cousin      Depression Brother    Significant stroke, heart attack, high cholesterol, high blood pressure, tremors, memory loss, vision loss.  His mother and sisters might have " "neuropathy.    SOCIAL HISTORY  Social History     Tobacco Use     Smoking status: Former     Current packs/day: 0.00     Average packs/day: 0.5 packs/day for 3.0 years (1.5 ttl pk-yrs)     Types: Cigarettes     Start date: 1963     Quit date: 1966     Years since quittin.5     Smokeless tobacco: Never   Vaping Use     Vaping status: Never Used   Substance Use Topics     Alcohol use: Not Currently     Alcohol/week: 3.0 standard drinks of alcohol     Comment: Quit alcohol nearly 2 years ago     Drug use: Never       SYSTEMS REVIEW  Twelve-system ROS was done and other than the HPI this was negative/positive for back pain, joint pain, numbness/tingling, movement disorder with right hand shaking on the computer, bladder symptoms, impotence.    MEDICATIONS  Current Outpatient Medications   Medication Sig Dispense Refill     amLODIPine (NORVASC) 10 MG tablet Take 1 tablet (10 mg) by mouth daily. 90 tablet 3     cyanocobalamin (VITAMIN B-12) 100 MCG tablet Take 100 mcg by mouth daily       folic acid (FOLVITE) 400 MCG tablet Take 400 mcg by mouth daily.       gabapentin (NEURONTIN) 300 MG capsule Take 2 capsules (600 mg) by mouth 3 times daily. 540 capsule 3     losartan (COZAAR) 50 MG tablet Take 1 tablet (50 mg) by mouth daily. 90 tablet 3     Omega-3 Fatty Acids (OMEGA-3 FISH OIL PO) Take 1,200 mg by mouth 2 times daily (with meals).       rosuvastatin (CRESTOR) 10 MG tablet Take 1 tablet (10 mg) by mouth daily. 90 tablet 2     sodium bicarbonate 325 MG tablet Take 1 tablet (325 mg) by mouth 2 times daily (Patient taking differently: Take 325 mg by mouth once.) 180 tablet 3     VITAMIN D, CHOLECALCIFEROL, PO Take 2,000 Units by mouth daily       No current facility-administered medications for this visit.        PHYSICAL EXAMINATION  VITALS: /59 (BP Location: Right arm, Patient Position: Sitting)   Pulse 65   Ht 1.727 m (5' 8\")   Wt 75.8 kg (167 lb)   BMI 25.39 kg/m    GENERAL: Healthy appearing, " alert, no acute distress, normal habitus.  CARDIOVASCULAR: Extremities warm and well perfused. Pulses present.   NEUROLOGICAL:  Patient is awake and oriented to self, place and time.  Attention span is normal.  Memory is grossly intact.  Language is fluent and follows commands appropriately.  Appropriate fund of knowledge. Cranial nerves 2-12 are intact. There is no pronator drift.  Motor exam shows 5/5 strength in all extremities.  Tone is symmetric bilaterally in upper and lower extremities.  Reflexes are symmetric and 2+ in upper extremities and lower extremities.  Ankle jerks absent.  Sensory exam is grossly intact to light touch, pin prick and vibration with decreased sensation to the midshin level.  Finger to nose and heel to shin is without dysmetria.  Romberg is negative.  Gait is normal and the patient is able to do tandem walk and walk on toes and heels with some difficulty. Mild vocal tremor.  Exam stable.  Vibratory sense was intact in the ankle along with pinprick sensation.  Stable from last time but significant improvement compared to before.    DIAGNOSTICS  MRI  IMPRESSION:  1.  Severe spinal canal stenosis at L4-L5 and L3-L4.  2.  Mild spinal canal stenosis at L2-L3 and L1-L2.  3.  Severe right foraminal stenosis at L3-L4 with ganglionic impingement.  4.  Moderate to severe left foraminal stenosis at L4-L5 with exiting nerve root contact/impingement.  5.  Levoconvex degenerative curvature.  6.  Endplate and facet edema consistent with active degenerative disc disease and facet disease and may function as a source of low back pain. Correlate clinically.    EMG 2024  Interpretation:  This is an abnormal EMG.  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  This would be mild to moderate in severity.     RELEVANT LABS  Component      Latest Ref Rng 12/18/2023  4:22 PM 2/19/2024  12:49 PM 3/27/2024  1:11 PM   Sodium      135 - 145  mmol/L 138      Potassium      3.4 - 5.3 mmol/L 4.5      Chloride      98 - 107 mmol/L 103      Carbon Dioxide (CO2)      22 - 29 mmol/L 24      Anion Gap      7 - 15 mmol/L 11      Glucose      70 - 99 mg/dL 119 (H)      Urea Nitrogen      8.0 - 23.0 mg/dL 20.1      Creatinine      0.67 - 1.17 mg/dL 1.20 (H)      GFR Estimate      >60 mL/min/1.73m2 62      Calcium      8.8 - 10.2 mg/dL 9.8      Albumin      3.5 - 5.2 g/dL 4.7      Phosphorus      2.5 - 4.5 mg/dL 3.2      Albumin Fraction      3.7 - 5.1 g/dL   4.8    Alpha 1 Fraction      0.2 - 0.4 g/dL   0.3    Alpha 2 Fraction      0.5 - 0.9 g/dL   0.6    Beta Fraction      0.6 - 1.0 g/dL   0.9    Gamma Fraction      0.7 - 1.6 g/dL   0.8    Monoclonal Peak      <=0.0 g/dL   0.2 (H)    ELP Interpretation:   Small monoclonal protein (0.2 g/dL) seen in the beta fraction, not previously characterized in our laboratory. Recommend serum and urine immunofixation for confirmation and further characterization if not previously performed elsewhere. Pathologic significance requires clinical correlation. Ela Wilson M.D.    Vitamin B12      232 - 1,245 pg/mL  1,033     WILLIAM interpretation      Negative    Negative    TSH      0.30 - 4.20 uIU/mL   1.51    Hemoglobin A1C      0.0 - 5.6 %   5.2    Total Protein Serum for ELP      6.4 - 8.3 g/dL   7.4    Immunofixation ELP   Monoclonal IgG immunoglobulin of kappa light chain type. Monoclonal free immunoglobulin light chain of lambda chain type. Monoclonal antibody therapeutics (e.g. Daratumumab) may appear as monoclonal proteins on serum electrophoresis and immunofixation. Results should be interpreted with caution. Pathologic significance requires clinical correlation. Maximino Villa MD       Legend:  (H) High    OUTSIDE RECORDS  Outside referral notes and chart notes were reviewed and pertinent information has been summarized (in addition to the HPI):-          LABS  Component      Latest Ref Rng 4/11/2024  11:32 AM  4/11/2024  11:39 AM   Vitamin B1 Whole Blood Level      70 - 180 nmol/L 190 (H)     Copper      70.0 - 140.0 ug/dL 109.7     Lyme Disease Antibodies Serum      <0.90  0.06     Vitamin B6      20.0 - 125.0 nmol/L 426.0 (H)     Immunofix ELP Urine  Monoclonal free immunoglobulin light chain of kappa chain type. Pathologic significance requires clinical correlation. Chiquis Almazan MD       Legend:  (H) High    Podiatry notes reviewed.    Component      Latest Ref Rng 1/22/2025  12:55 PM   Vitamin B6      20.0 - 125.0 nmol/L 146.4 (H)       Legend:  (H) High    IMPRESSION/REPORT/PLAN  Foot pain  Abnormal serum electrophoresis/MGUS  Neuropathy  Family history of neuropathy  Vitamin B6 use  Lumbar spinal stenosis  Elevated vitamin B6 level    This is a 79 year old male with numbness and pain in his feet.  His exam is suggestive of a neuropathy.  EMG is also confirmed diagnosis of neuropathy.  There is family history of neuropathy and his neuropathy could be related to genetic causes.  Other possibility would be idiopathic neuropathy.  He does have a positive serum protein electrophoresis and has been diagnosed with MGUS which could also be a cause for his neuropathy.  Blood work was otherwise negative.  Vitamin B6 level was elevated as he takes supplements which could be a cause of his neuropathy.  With stopping the supplements his neuropathy has started to improve and there is some improvement on exam as well.  He has made significant improvement with stopping the vitamin B6 supplement most likely suggesting neuropathy was from this.  There is some family history also.  Will recheck vitamin B6 level to see if it is normalized though long-term he should not go back on vitamin B6 unless it is low. Discussed limiting sweets and energy drinks.  Recommend healthy diet.    His pain does not seem consistent with neuropathy since it is not pins-and-needles or burning in nature.  Is worse with activity and generally  neuropathic pain is worse with rest.  He has been diagnosed to have plantar fasciitis through podiatry.  Insoles are helping.    Can continue gabapentin and Tylenol which is helping.    He does have back pain.  I put in a referral for physical therapy.  Could consider referral to the spine center if physical therapy does not help. Improving with exercises    Continue follow-up with hematology/oncology for MGUS.  Stable.    I can see him back in about 12 months.  Discussed problems of neuropathy.  Recommend exercise and healthy lifestyle.    -     Physical Therapy  Referral; Future  -     Vitamin B6; Future  - Continue follow-up with hematology/oncology for MGUS.    Return in about 1 year (around 7/15/2026) for In-Clinic Visit (must), After testing.    Over 30 minutes were spent coordinating the care for the patient on the day of the encounter.  This includes previsit, during visit and post visit activities as documented above.  Counseling patient.  Reviewing chart.  Testing ordered.  (Activities include but not inclusive of reviewing chart, reviewing outside records, reviewing labs and imaging study results as well as the images, patient visit time including getting history and exam,  use if applicable, review of test results with the patient and coming up with a plan in a shared model, counseling patient and family, education and answering patient questions, EMR , EMR diagnosis entry and problem list management, medication reconciliation and prescription management if applicable, paperwork if applicable, printing documents and documentation of the visit activities.)        Giovanni Martinez MD  Neurologist  [facility]  Tel:- [phone]    This note was dictated using voice recognition software.  Any grammatical or context distortions are unintentional and inherent to the software.    The longitudinal plan of care for the diagnosis(es)/condition(s) as  documented were addressed during this visit. Due to the added complexity in care, I will continue to support Don in the subsequent management and with ongoing continuity of care.      Again, thank you for allowing me to participate in the care of your patient.        Sincerely,        Giovanni Martinez MD    Electronically signed

## 2025-07-21 ENCOUNTER — LAB (OUTPATIENT)
Dept: LAB | Facility: CLINIC | Age: 80
End: 2025-07-21
Payer: COMMERCIAL

## 2025-07-21 DIAGNOSIS — E67.2 HYPERVITAMINOSIS B6: ICD-10-CM

## 2025-07-21 PROCEDURE — 99000 SPECIMEN HANDLING OFFICE-LAB: CPT

## 2025-07-21 PROCEDURE — 84207 ASSAY OF VITAMIN B-6: CPT | Mod: 90

## 2025-07-21 PROCEDURE — 36415 COLL VENOUS BLD VENIPUNCTURE: CPT

## 2025-07-24 LAB — PYRIDOXAL PHOS SERPL-SCNC: 26.1 NMOL/L

## 2025-07-25 ENCOUNTER — RESULTS FOLLOW-UP (OUTPATIENT)
Dept: NEUROLOGY | Facility: CLINIC | Age: 80
End: 2025-07-25
Payer: COMMERCIAL

## 2025-08-18 ENCOUNTER — PATIENT OUTREACH (OUTPATIENT)
Dept: CARE COORDINATION | Facility: CLINIC | Age: 80
End: 2025-08-18
Payer: COMMERCIAL

## 2025-08-26 ENCOUNTER — MYC MEDICAL ADVICE (OUTPATIENT)
Dept: FAMILY MEDICINE | Facility: CLINIC | Age: 80
End: 2025-08-26
Payer: COMMERCIAL

## 2025-08-26 DIAGNOSIS — N52.9 ERECTILE DYSFUNCTION, UNSPECIFIED ERECTILE DYSFUNCTION TYPE: Primary | ICD-10-CM

## 2025-08-28 RX ORDER — TADALAFIL 5 MG/1
5 TABLET ORAL DAILY
Qty: 30 TABLET | Refills: 1 | Status: SHIPPED | OUTPATIENT
Start: 2025-08-28